# Patient Record
Sex: FEMALE | Race: OTHER | Employment: OTHER | URBAN - METROPOLITAN AREA
[De-identification: names, ages, dates, MRNs, and addresses within clinical notes are randomized per-mention and may not be internally consistent; named-entity substitution may affect disease eponyms.]

---

## 2024-06-24 ENCOUNTER — OFFICE VISIT (OUTPATIENT)
Age: 34
End: 2024-06-24
Payer: COMMERCIAL

## 2024-06-24 VITALS
SYSTOLIC BLOOD PRESSURE: 101 MMHG | DIASTOLIC BLOOD PRESSURE: 65 MMHG | BODY MASS INDEX: 26.26 KG/M2 | HEIGHT: 64 IN | WEIGHT: 153.8 LBS

## 2024-06-24 DIAGNOSIS — Z01.419 WELL WOMAN EXAM WITH ROUTINE GYNECOLOGICAL EXAM: Primary | ICD-10-CM

## 2024-06-24 PROCEDURE — 99204 OFFICE O/P NEW MOD 45 MIN: CPT | Performed by: OBSTETRICS & GYNECOLOGY

## 2024-06-24 RX ORDER — DROSPIRENONE AND ETHINYL ESTRADIOL 0.02-3(28)
1 KIT ORAL DAILY
Qty: 3 PACKET | Refills: 1 | Status: SHIPPED | OUTPATIENT
Start: 2024-06-24 | End: 2024-09-16

## 2024-06-24 NOTE — PROGRESS NOTES
PROBLEM VISIT      Paul Guerrier is 33 y.o. year old  /   female who presents for issues related to her IUD.  She had a Mirena placed several years ago.  She is not having periods.  She has been suffering from worsening acne and is upset about weight gain.  Also thinking about another baby.      Problem List:  Patient Active Problem List    Diagnosis Date Noted    Generalized anxiety disorder with panic attacks 2023    IUD (intrauterine device) in place 2023     Past Medical History:   Diagnosis Date    Anxiety     Depression     IUD (intrauterine device) in place 2017    Mirena -- acne/weight gain     Past Surgical History:   Procedure Laterality Date    BREAST SURGERY       SECTION  2015     SECTION  2019    NOSE SURGERY         OB History    Para Term  AB Living   4 2 2 0 2 2   SAB IAB Ectopic Molar Multiple Live Births   2 0 0 0 0 2      # Outcome Date GA Lbr Alexei/2nd Weight Sex Delivery Anes PTL Lv   4 Term 19    M CS-LTranv   GARY   3 Term 12/17/15    M CS-LTranv   GARY   2 SAB            1 SAB              Social History     Socioeconomic History    Marital status:      Spouse name: None    Number of children: 2    Years of education: None    Highest education level: None   Tobacco Use    Smoking status: Never    Smokeless tobacco: Never   Vaping Use    Vaping Use: Never used   Substance and Sexual Activity    Alcohol use: Not Currently    Drug use: Never    Sexual activity: Yes     Partners: Male     Birth control/protection: I.U.D.     Social Determinants of Health     Financial Resource Strain: Low Risk  (2023)    Overall Financial Resource Strain (CARDIA)     Difficulty of Paying Living Expenses: Not hard at all   Transportation Needs: Unknown (2023)    PRAPARE - Transportation     Lack of Transportation (Non-Medical): No   Housing Stability: Unknown (2023)    Housing Stability Vital Sign

## 2024-06-24 NOTE — PROGRESS NOTES
Paul Guerrier is a 33 y.o. female presents for a problem visit.    Chief Complaint   Patient presents with    Procedure     IUD removal            No LMP recorded.    Birth Control: IUD.    Last Pap: normal obtained 6 - 8 months ago.    Patient wants to conceive.        1. Have you been to the ER, urgent care clinic, or hospitalized since your last visit? N/A NP      2. Have you seen or consulted any other health care providers outside of the [facility] since your last visit? N/A NP          Chart reviewed for the following:   VAL ESQUEDA MARGARET PATTIE, CMA, have reviewed the History, Physical and updated the Allergic reactions for Paul Guerrier     TIME OUT performed immediately prior to start of procedure:   VAL ESQUEDA MARGARET PATTIE, CMA, have performed the following reviews on Paul Guerrier prior to the start of the procedure:            * Patient was identified by name and date of birth   * Agreement on procedure being performed was verified  * Risks and Benefits explained to the patient  * Procedure site verified and marked as necessary  * Patient was positioned for comfort  * Consent was signed and verified     Time: 1:40pm    Date of procedure: 6/24/2024    Procedure performed by:  Garrett Castillo MD       Provider assisted by: Leila Lima MA    Patient assisted by: self    How tolerated by patient: attempted could not remove IUD.      Post Procedural Pain Scale: hurts     Comments: none    Examination chaperoned by GUALBERTO LIMA CMA.

## 2024-07-15 ENCOUNTER — OFFICE VISIT (OUTPATIENT)
Age: 34
End: 2024-07-15
Payer: COMMERCIAL

## 2024-07-15 VITALS
HEIGHT: 64 IN | BODY MASS INDEX: 25.99 KG/M2 | DIASTOLIC BLOOD PRESSURE: 64 MMHG | SYSTOLIC BLOOD PRESSURE: 104 MMHG | WEIGHT: 152.2 LBS

## 2024-07-15 DIAGNOSIS — T83.32XA INTRAUTERINE CONTRACEPTIVE DEVICE THREADS LOST, INITIAL ENCOUNTER: Primary | ICD-10-CM

## 2024-07-15 PROCEDURE — 99213 OFFICE O/P EST LOW 20 MIN: CPT | Performed by: OBSTETRICS & GYNECOLOGY

## 2024-07-15 RX ORDER — BUSPIRONE HYDROCHLORIDE 5 MG/1
5 TABLET ORAL 2 TIMES DAILY
COMMUNITY
Start: 2024-07-09

## 2024-07-15 NOTE — PROGRESS NOTES
Paul Jacob is a 33 y.o. female presents for a problem visit.    Chief Complaint   Patient presents with    Follow-up    Ultrasound     No LMP recorded. (Menstrual status: IUD).  Birth Control: IUD.  Last Pap: normal obtained 6 - 8 months ago.    The patient is reporting having: Ultrasound Follow Up for IUD.   .  Ultrasound report:  TV ULTRASOUND PERFORMED. UTERUS IS ANTEVERTED, NORMAL IN SIZE AND ECHOGENICITY. ENDOMETRIUM MEASURES 2-3MM IN THICKNESS. NO EVIDENCE OF MASSES OR ABNORMALITIES ARE SEEN. IUD IS SEEN IN THE PROPER POSITION WITHIN THE ENDOMETRIAL CAVITY IN THE UTERINE FUNDUS. RIGHT OVARY APPEARS WITHIN NORMAL LIMITS. LEFT OVARY APPEARS WITHIN NORMAL LIMITS. FREE FLUID SEEN IN THE CDS.        1. Have you been to the ER, urgent care clinic, or hospitalized since your last visit? No    2. Have you seen or consulted any other health care providers outside of the [facility] since your last visit? No    Examination chaperoned by GUALBERTO NEAL CMA.

## 2024-07-15 NOTE — PROGRESS NOTES
PROBLEM VISIT      Paul Jacob is 33 y.o. year old  /   female who presents for evaluation of lost IUD string.    Wants removed due to acne and weight gain.  Attempted several times at last visit without success.     Ultrasound was done today to evaluate these complaints and shows:   TV ULTRASOUND PERFORMED. UTERUS IS ANTEVERTED, NORMAL IN SIZE AND ECHOGENICITY. ENDOMETRIUM MEASURES 2-3MM IN THICKNESS. NO EVIDENCE OF MASSES OR ABNORMALITIES ARE SEEN. IUD IS SEEN IN THE PROPER POSITION WITHIN THE ENDOMETRIAL CAVITY IN THE UTERINE FUNDUS. RIGHT OVARY APPEARS WITHIN NORMAL LIMITS. LEFT OVARY APPEARS WITHIN NORMAL LIMITS. FREE FLUID SEEN IN THE CDS.     Problem List:  Patient Active Problem List    Diagnosis Date Noted    Generalized anxiety disorder with panic attacks 2023    IUD (intrauterine device) in place 2023     Past Medical History:   Diagnosis Date    Anxiety     Depression     IUD (intrauterine device) in place     Mirena -- acne/weight gain     Past Surgical History:   Procedure Laterality Date    BREAST SURGERY       SECTION       SECTION  2019    NOSE SURGERY         OB History    Para Term  AB Living   4 2 2 0 2 2   SAB IAB Ectopic Molar Multiple Live Births   2 0 0 0 0 2      # Outcome Date GA Lbr Alexei/2nd Weight Sex Delivery Anes PTL Lv   4 Term 19    M CS-LTranv   GARY   3 Term /15    M CS-LTranv   GARY   2 SAB            1 SAB              Social History     Socioeconomic History    Marital status:     Number of children: 2   Tobacco Use    Smoking status: Never    Smokeless tobacco: Never   Vaping Use    Vaping Use: Never used   Substance and Sexual Activity    Alcohol use: Not Currently    Drug use: Never    Sexual activity: Yes     Partners: Male     Birth control/protection: I.U.D.   Social History Narrative    [facility]      Social Determinants of Health     Financial Resource Strain: Low

## 2024-07-18 ENCOUNTER — PREP FOR PROCEDURE (OUTPATIENT)
Facility: HOSPITAL | Age: 34
End: 2024-07-18

## 2024-07-18 PROBLEM — T83.32XA MALPOSITIONED INTRAUTERINE DEVICE: Status: ACTIVE | Noted: 2024-07-18

## 2024-07-23 ENCOUNTER — PREP FOR PROCEDURE (OUTPATIENT)
Facility: HOSPITAL | Age: 34
End: 2024-07-23

## 2024-07-23 DIAGNOSIS — T83.32XD MALPOSITIONED INTRAUTERINE DEVICE (IUD), SUBSEQUENT ENCOUNTER: Primary | ICD-10-CM

## 2024-08-21 NOTE — PERIOP NOTE
or metal hair clips  Wear loose, comfortable, clean clothes  Wear glasses instead of contacts  Leave money, valuables, and jewelry, including body piercings, at home  If you were given an Incentive Spirometer, bring it on day of surgery.     Going Home - or Spending the Night SAME-DAY SURGERY: You must have a responsible adult drive you home and stay with you 24 hours after surgery  ADMITS: If your doctor is keeping you in the hospital after surgery, leave personal belongings/luggage in your car until you have a hospital room number.       Hospital discharge time is 12 noon     Drivers must be here before 12 noon unless you are told differently   Special Instructions           Follow all instructions so your surgery won’t be cancelled.  Please, be on time.                    If a situation occurs and you are delayed the day of surgery, call [phone] or [phone].    If your physical condition changes (like a fever, cold, flu, etc.) call your surgeon.    Home medication(s) reviewed and verified verbally with list during PAT appointment.

## 2024-08-22 ENCOUNTER — HOSPITAL ENCOUNTER (OUTPATIENT)
Facility: HOSPITAL | Age: 34
Setting detail: OUTPATIENT SURGERY
Discharge: HOME | End: 2024-08-22
Attending: OBSTETRICS & GYNECOLOGY | Admitting: OBSTETRICS & GYNECOLOGY
Payer: COMMERCIAL

## 2024-08-22 ENCOUNTER — ANESTHESIA (OUTPATIENT)
Facility: HOSPITAL | Age: 34
End: 2024-08-22
Payer: COMMERCIAL

## 2024-08-22 ENCOUNTER — ANESTHESIA EVENT (OUTPATIENT)
Facility: HOSPITAL | Age: 34
End: 2024-08-22
Payer: COMMERCIAL

## 2024-08-22 VITALS
HEART RATE: 72 BPM | WEIGHT: 155.2 LBS | RESPIRATION RATE: 15 BRPM | SYSTOLIC BLOOD PRESSURE: 108 MMHG | TEMPERATURE: 97.2 F | HEIGHT: 62 IN | BODY MASS INDEX: 28.56 KG/M2 | OXYGEN SATURATION: 100 % | DIASTOLIC BLOOD PRESSURE: 68 MMHG

## 2024-08-22 DIAGNOSIS — T83.32XD MALPOSITIONED INTRAUTERINE DEVICE (IUD), SUBSEQUENT ENCOUNTER: ICD-10-CM

## 2024-08-22 LAB
BASOPHILS # BLD: 0 K/UL (ref 0–0.1)
BASOPHILS NFR BLD: 1 % (ref 0–1)
DIFFERENTIAL METHOD BLD: ABNORMAL
EOSINOPHIL # BLD: 0.1 K/UL (ref 0–0.4)
EOSINOPHIL NFR BLD: 1 % (ref 0–7)
ERYTHROCYTE [DISTWIDTH] IN BLOOD BY AUTOMATED COUNT: 12.4 % (ref 11.5–14.5)
HCT VFR BLD AUTO: 34.8 % (ref 35–47)
HGB BLD-MCNC: 11.6 G/DL (ref 11.5–16)
IMM GRANULOCYTES # BLD AUTO: 0 K/UL (ref 0–0.04)
IMM GRANULOCYTES NFR BLD AUTO: 0 % (ref 0–0.5)
LYMPHOCYTES # BLD: 1.1 K/UL (ref 0.8–3.5)
LYMPHOCYTES NFR BLD: 23 % (ref 12–49)
MCH RBC QN AUTO: 29.7 PG (ref 26–34)
MCHC RBC AUTO-ENTMCNC: 33.3 G/DL (ref 30–36.5)
MCV RBC AUTO: 89 FL (ref 80–99)
MONOCYTES # BLD: 0.6 K/UL (ref 0–1)
MONOCYTES NFR BLD: 13 % (ref 5–13)
NEUTS SEG # BLD: 3 K/UL (ref 1.8–8)
NEUTS SEG NFR BLD: 62 % (ref 32–75)
NRBC # BLD: 0 K/UL (ref 0–0.01)
NRBC BLD-RTO: 0 PER 100 WBC
PLATELET # BLD AUTO: 267 K/UL (ref 150–400)
PMV BLD AUTO: 10.1 FL (ref 8.9–12.9)
RBC # BLD AUTO: 3.91 M/UL (ref 3.8–5.2)
WBC # BLD AUTO: 4.8 K/UL (ref 3.6–11)

## 2024-08-22 PROCEDURE — 2500000003 HC RX 250 WO HCPCS

## 2024-08-22 PROCEDURE — 36415 COLL VENOUS BLD VENIPUNCTURE: CPT

## 2024-08-22 PROCEDURE — 3700000001 HC ADD 15 MINUTES (ANESTHESIA): Performed by: OBSTETRICS & GYNECOLOGY

## 2024-08-22 PROCEDURE — 3600000013 HC SURGERY LEVEL 3 ADDTL 15MIN: Performed by: OBSTETRICS & GYNECOLOGY

## 2024-08-22 PROCEDURE — 7100000011 HC PHASE II RECOVERY - ADDTL 15 MIN: Performed by: OBSTETRICS & GYNECOLOGY

## 2024-08-22 PROCEDURE — 7100000010 HC PHASE II RECOVERY - FIRST 15 MIN: Performed by: OBSTETRICS & GYNECOLOGY

## 2024-08-22 PROCEDURE — 2580000003 HC RX 258

## 2024-08-22 PROCEDURE — 2709999900 HC NON-CHARGEABLE SUPPLY: Performed by: OBSTETRICS & GYNECOLOGY

## 2024-08-22 PROCEDURE — 85025 COMPLETE CBC W/AUTO DIFF WBC: CPT

## 2024-08-22 PROCEDURE — 58562 HYSTEROSCOPY REMOVE FB: CPT | Performed by: OBSTETRICS & GYNECOLOGY

## 2024-08-22 PROCEDURE — 3700000000 HC ANESTHESIA ATTENDED CARE: Performed by: OBSTETRICS & GYNECOLOGY

## 2024-08-22 PROCEDURE — 6360000002 HC RX W HCPCS

## 2024-08-22 PROCEDURE — 3600000003 HC SURGERY LEVEL 3 BASE: Performed by: OBSTETRICS & GYNECOLOGY

## 2024-08-22 RX ORDER — PROPOFOL 10 MG/ML
INJECTION, EMULSION INTRAVENOUS PRN
Status: DISCONTINUED | OUTPATIENT
Start: 2024-08-22 | End: 2024-08-22 | Stop reason: SDUPTHER

## 2024-08-22 RX ORDER — SODIUM CHLORIDE, SODIUM LACTATE, POTASSIUM CHLORIDE, CALCIUM CHLORIDE 600; 310; 30; 20 MG/100ML; MG/100ML; MG/100ML; MG/100ML
INJECTION, SOLUTION INTRAVENOUS CONTINUOUS
Status: DISCONTINUED | OUTPATIENT
Start: 2024-08-22 | End: 2024-08-22 | Stop reason: HOSPADM

## 2024-08-22 RX ORDER — KETOROLAC TROMETHAMINE 30 MG/ML
INJECTION, SOLUTION INTRAMUSCULAR; INTRAVENOUS PRN
Status: DISCONTINUED | OUTPATIENT
Start: 2024-08-22 | End: 2024-08-22 | Stop reason: SDUPTHER

## 2024-08-22 RX ORDER — NALOXONE HYDROCHLORIDE 0.4 MG/ML
INJECTION, SOLUTION INTRAMUSCULAR; INTRAVENOUS; SUBCUTANEOUS PRN
Status: DISCONTINUED | OUTPATIENT
Start: 2024-08-22 | End: 2024-08-22 | Stop reason: HOSPADM

## 2024-08-22 RX ORDER — DEXMEDETOMIDINE HYDROCHLORIDE 100 UG/ML
INJECTION, SOLUTION INTRAVENOUS PRN
Status: DISCONTINUED | OUTPATIENT
Start: 2024-08-22 | End: 2024-08-22 | Stop reason: SDUPTHER

## 2024-08-22 RX ORDER — LIDOCAINE HYDROCHLORIDE 20 MG/ML
INJECTION, SOLUTION EPIDURAL; INFILTRATION; INTRACAUDAL; PERINEURAL PRN
Status: DISCONTINUED | OUTPATIENT
Start: 2024-08-22 | End: 2024-08-22 | Stop reason: SDUPTHER

## 2024-08-22 RX ORDER — MIDAZOLAM HYDROCHLORIDE 1 MG/ML
INJECTION INTRAMUSCULAR; INTRAVENOUS PRN
Status: DISCONTINUED | OUTPATIENT
Start: 2024-08-22 | End: 2024-08-22 | Stop reason: SDUPTHER

## 2024-08-22 RX ORDER — FENTANYL CITRATE 50 UG/ML
INJECTION, SOLUTION INTRAMUSCULAR; INTRAVENOUS PRN
Status: DISCONTINUED | OUTPATIENT
Start: 2024-08-22 | End: 2024-08-22 | Stop reason: SDUPTHER

## 2024-08-22 RX ORDER — ONDANSETRON 2 MG/ML
4 INJECTION INTRAMUSCULAR; INTRAVENOUS
Status: DISCONTINUED | OUTPATIENT
Start: 2024-08-22 | End: 2024-08-22 | Stop reason: HOSPADM

## 2024-08-22 RX ORDER — DIPHENHYDRAMINE HYDROCHLORIDE 50 MG/ML
12.5 INJECTION INTRAMUSCULAR; INTRAVENOUS
Status: DISCONTINUED | OUTPATIENT
Start: 2024-08-22 | End: 2024-08-22 | Stop reason: HOSPADM

## 2024-08-22 RX ORDER — FENTANYL CITRATE 50 UG/ML
100 INJECTION, SOLUTION INTRAMUSCULAR; INTRAVENOUS
Status: DISCONTINUED | OUTPATIENT
Start: 2024-08-22 | End: 2024-08-22 | Stop reason: HOSPADM

## 2024-08-22 RX ORDER — MIDAZOLAM HYDROCHLORIDE 2 MG/2ML
2 INJECTION, SOLUTION INTRAMUSCULAR; INTRAVENOUS
Status: DISCONTINUED | OUTPATIENT
Start: 2024-08-22 | End: 2024-08-22 | Stop reason: HOSPADM

## 2024-08-22 RX ORDER — LIDOCAINE HYDROCHLORIDE 10 MG/ML
1 INJECTION, SOLUTION EPIDURAL; INFILTRATION; INTRACAUDAL; PERINEURAL
Status: DISCONTINUED | OUTPATIENT
Start: 2024-08-22 | End: 2024-08-22 | Stop reason: HOSPADM

## 2024-08-22 RX ORDER — SODIUM CHLORIDE, SODIUM LACTATE, POTASSIUM CHLORIDE, CALCIUM CHLORIDE 600; 310; 30; 20 MG/100ML; MG/100ML; MG/100ML; MG/100ML
INJECTION, SOLUTION INTRAVENOUS CONTINUOUS PRN
Status: DISCONTINUED | OUTPATIENT
Start: 2024-08-22 | End: 2024-08-22 | Stop reason: SDUPTHER

## 2024-08-22 RX ADMIN — SODIUM CHLORIDE, SODIUM LACTATE, POTASSIUM CHLORIDE, AND CALCIUM CHLORIDE: 600; 310; 30; 20 INJECTION, SOLUTION INTRAVENOUS at 15:25

## 2024-08-22 RX ADMIN — FENTANYL CITRATE 50 MCG: 50 INJECTION, SOLUTION INTRAMUSCULAR; INTRAVENOUS at 15:28

## 2024-08-22 RX ADMIN — DEXMEDETOMIDINE 4 MCG: 100 INJECTION, SOLUTION INTRAVENOUS at 15:25

## 2024-08-22 RX ADMIN — KETOROLAC TROMETHAMINE 30 MG: 30 INJECTION, SOLUTION INTRAMUSCULAR at 15:48

## 2024-08-22 RX ADMIN — FENTANYL CITRATE 25 MCG: 50 INJECTION, SOLUTION INTRAMUSCULAR; INTRAVENOUS at 15:40

## 2024-08-22 RX ADMIN — LIDOCAINE HYDROCHLORIDE 40 MG: 20 INJECTION, SOLUTION EPIDURAL; INFILTRATION; INTRACAUDAL; PERINEURAL at 15:28

## 2024-08-22 RX ADMIN — MIDAZOLAM HYDROCHLORIDE 2 MG: 1 INJECTION, SOLUTION INTRAMUSCULAR; INTRAVENOUS at 15:25

## 2024-08-22 RX ADMIN — FENTANYL CITRATE 25 MCG: 50 INJECTION, SOLUTION INTRAMUSCULAR; INTRAVENOUS at 15:37

## 2024-08-22 RX ADMIN — PROPOFOL 50 MG: 10 INJECTION, EMULSION INTRAVENOUS at 15:28

## 2024-08-22 RX ADMIN — PROPOFOL 100 MCG/KG/MIN: 10 INJECTION, EMULSION INTRAVENOUS at 15:29

## 2024-08-22 ASSESSMENT — PAIN - FUNCTIONAL ASSESSMENT: PAIN_FUNCTIONAL_ASSESSMENT: NONE - DENIES PAIN

## 2024-08-22 ASSESSMENT — PAIN SCALES - GENERAL: PAINLEVEL_OUTOF10: 0

## 2024-08-22 NOTE — H&P
Gynecology Preop History and Physical    Name: Paul Jacob MRN: [id number] SSN: [id number]    YOB: 1990  Age: 33 y.o.  Sex: female       Subjective:      Chief complaint: Lost IUD string      Paul Jacob is 33 y.o. year old  /   female who presents for lost IUD string.  Previous evaluation has been done with US, which revealed intrauterine IUD.  Previous treatment has consisted of attempted office removal patient could not tolerate.  She is now admitted for outpatient surgery consisting of Procedure(s) (LRB):  HYSTEROSCOPIC INTRAUTERINE DEVICE REMOVAL (N/A).    The current method of family planning is intrauterine device.    Problem List:  Patient Active Problem List    Diagnosis Date Noted    Malpositioned intrauterine device 2024    Generalized anxiety disorder with panic attacks 2023    IUD (intrauterine device) in place 2023     Past Medical History:   Diagnosis Date    Anxiety     Depression     IUD (intrauterine device) in place     Mirena -- acne/weight gain     Past Surgical History:   Procedure Laterality Date    BREAST ENHANCEMENT SURGERY Bilateral      SECTION  2015     SECTION  2019    RHINOPLASTY         OB History    Para Term  AB Living   4 2 2 0 2 2   SAB IAB Ectopic Molar Multiple Live Births   2 0 0 0 0 2      # Outcome Date GA Lbr Alexei/2nd Weight Sex Type Anes PTL Lv   4 Term 19    M CS-LTranv   GARY   3 Term /15    M CS-LTranv   GARY   2 SAB            1 SAB              Social History     Socioeconomic History    Marital status:      Spouse name: None    Number of children: 2    Years of education: None    Highest education level: None   Tobacco Use    Smoking status: Never    Smokeless tobacco: Never   Vaping Use    Vaping status: Never Used   Substance and Sexual Activity    Alcohol use: Not Currently    Drug use: Never    Sexual activity: Yes     Partners: Male     Birth

## 2024-08-22 NOTE — DISCHARGE SUMMARY
Gynecology Surgical Discharge Summary     Name: Paul Jacob MRN: [id number]  SSN: [id number]    YOB: 1990  Age: 33 y.o.  Sex: female      Admit date: 8/22/2024    Discharge Date: 8/22/2024      Attending Physician: Garrett Castillo II, MD     Admission Diagnoses: Malpositioned intrauterine device [T83.32XA]    Discharge Diagnoses: Malpositioned intrauterine device [T83.32XA]   Principal Problem:    Malpositioned intrauterine device  Resolved Problems:    * No resolved hospital problems. *       Procedures: Procedure(s):  HYSTEROSCOPIC INTRAUTERINE DEVICE REMOVAL    Hospital Course: Normal hospital course for this procedure.  The patient was released to her home in good condition.    Significant Diagnostic Studies:   Recent Results (from the past 24 hour(s))   CBC with Auto Differential    Collection Time: 08/22/24  2:30 PM   Result Value Ref Range    WBC 4.8 3.6 - 11.0 K/uL    RBC 3.91 3.80 - 5.20 M/uL    Hemoglobin 11.6 11.5 - 16.0 g/dL    Hematocrit 34.8 (L) 35.0 - 47.0 %    MCV 89.0 80.0 - 99.0 FL    MCH 29.7 26.0 - 34.0 PG    MCHC 33.3 30.0 - 36.5 g/dL    RDW 12.4 11.5 - 14.5 %    Platelets 267 150 - 400 K/uL    MPV 10.1 8.9 - 12.9 FL    Nucleated RBCs 0.0 0  WBC    nRBC 0.00 0.00 - 0.01 K/uL    Neutrophils % 62 32 - 75 %    Lymphocytes % 23 12 - 49 %    Monocytes % 13 5 - 13 %    Eosinophils % 1 0 - 7 %    Basophils % 1 0 - 1 %    Immature Granulocytes % 0 0.0 - 0.5 %    Neutrophils Absolute 3.0 1.8 - 8.0 K/UL    Lymphocytes Absolute 1.1 0.8 - 3.5 K/UL    Monocytes Absolute 0.6 0.0 - 1.0 K/UL    Eosinophils Absolute 0.1 0.0 - 0.4 K/UL    Basophils Absolute 0.0 0.0 - 0.1 K/UL    Immature Granulocytes Absolute 0.0 0.00 - 0.04 K/UL    Differential Type AUTOMATED         Patient Instructions:     Diet, activity, wound care: See printed instructions.    Current Discharge Medication List        I spent 10 minutes discharging the patient in face to face contact.  Follow-up Information

## 2024-08-22 NOTE — OP NOTE
[facility]  [address] [place] [address]    OPERATIVE REPORT    Name: Paul Jacob   Medical Record Number: [id number]   YOB: 1990    Date of Surgery: 8/22/2024    Preoperative Diagnosis: Malpositioned intrauterine device [T83.32XA]    Postoperative Diagnosis: Same    Procedure: Procedure(s):  HYSTEROSCOPIC INTRAUTERINE DEVICE REMOVAL    OR Staff:  Surgeon(s):  Garrett Castillo II, MD  * No surgical staff found *   Anesthesiologist: Augustine Bella MD  CRNA: Geeta Davila APRN - CRNA     Anesthesia: Monitor Anesthesia Care    Findings: see below    Estimated Blood Loss:  none    Drains: * No LDAs found *    Pathology /Specimens:  * No order type specified *    Implants:  * No implants in log *    DVT Prophylaxis: JHON Hose    Complications: None    PROCEDURE:  The patient was prepped and draped in the dorsal lithotomy position after institution of general anesthesia.  A weighted speculum was placed.  The cervix was grasped with a single-toothed tenaculum.   The cervix was dilated and then the myosure hysteroscope was introduced.  The Fluent fluid management system was used to distend the cavity with normal saline under a pressure of 80mm mercury. The endometrial cavity was inspected.  There was an IUD in the normal intrauterine position and a normal appearing endometrium noted.  The string was well up into the uterine cavity.  It was grasped with small graspers and The IUD was removed.The scope was removed.      The procedure was terminated. The patient was awakened and taken to the recovery room in good condition.    Signed By:  Garrett Castillo MD     August 22, 2024

## 2024-08-22 NOTE — ANESTHESIA POSTPROCEDURE EVALUATION
Department of Anesthesiology  Postprocedure Note    Patient: Paul Jacob  MRN: [id number]  YOB: 1990  Date of evaluation: 8/22/2024    Procedure Summary       Date: 08/22/24 Room / Location: [facility] MAIN OR  / [facility] MAIN OR    Anesthesia Start: 1525 Anesthesia Stop: 1601    Procedure: HYSTEROSCOPIC INTRAUTERINE DEVICE REMOVAL (Cervix) Diagnosis:       Malpositioned intrauterine device      (Malpositioned intrauterine device [T83.32XA])    Surgeons: Garrett Castillo II, MD Responsible Provider: Polina Olmos MD    Anesthesia Type: MAC ASA Status: 1            Anesthesia Type: MAC    Lory Phase I: Lory Score: 10    Lory Phase II: Lory Score: 10    Anesthesia Post Evaluation    No notable events documented.

## 2024-08-22 NOTE — ANESTHESIA PRE PROCEDURE
Department of Anesthesiology  Preprocedure Note       Name:  Paul Jacob   Age:  33 y.o.  :  1990                                          MRN:  [id number]         Date:  2024      Surgeon: Surgeon(s):  Garrett Castillo II, MD    Procedure: Procedure(s):  HYSTEROSCOPIC INTRAUTERINE DEVICE REMOVAL    Medications prior to admission:   Prior to Admission medications    Medication Sig Start Date End Date Taking? Authorizing Provider   sertraline (ZOLOFT) 50 MG tablet Take 1 tablet by mouth daily 24   Rahel Hurtado MD   busPIRone (BUSPAR) 5 MG tablet Take 1 tablet by mouth daily 24   ProviderRahel MD       Current medications:    Current Facility-Administered Medications   Medication Dose Route Frequency Provider Last Rate Last Admin   • lidocaine PF 1 % injection 1 mL  1 mL IntraDERmal Once PRN Ze Oseguera MD       • fentaNYL (SUBLIMAZE) injection 100 mcg  100 mcg IntraVENous Once PRN Ze Oseguera MD       • lactated ringers IV soln infusion   IntraVENous Continuous Ze Oseguera MD       • midazolam PF (VERSED) injection 2 mg  2 mg IntraVENous Once PRN Ze Oseguera MD           Allergies:  No Known Allergies    Problem List:    Patient Active Problem List   Diagnosis Code   • Generalized anxiety disorder with panic attacks F41.1, F41.0   • IUD (intrauterine device) in place Z97.5   • Malpositioned intrauterine device T83.32XA       Past Medical History:        Diagnosis Date   • Anxiety    • Depression    • IUD (intrauterine device) in place 2017    Mirena -- acne/weight gain       Past Surgical History:        Procedure Laterality Date   • BREAST ENHANCEMENT SURGERY Bilateral    •  SECTION  2015   •  SECTION  2019   • RHINOPLASTY         Social History:    Social History     Tobacco Use   • Smoking status: Never   • Smokeless tobacco: Never   Substance Use Topics   • Alcohol use: Not Currently                                Counseling given:

## 2024-08-22 NOTE — DISCHARGE INSTRUCTIONS
Post Anesthesia instructions:  Limit activities  Don't drive until tomorrow morning  Don't make important decisions until tomorrow morning  Don't drink alcohol for 24 hours.

## 2025-01-02 DIAGNOSIS — N91.2 AMENORRHEA: Primary | ICD-10-CM

## 2025-01-08 ENCOUNTER — CLINICAL DOCUMENTATION (OUTPATIENT)
Age: 35
End: 2025-01-08

## 2025-01-08 NOTE — PROGRESS NOTES
Patient's appointments for 01/06/2025 at 10:00 am for ultrasound and 10:45 am for Dr. Garrett Castillo were attempted to be cancelled by Iqra Bella with three separate phone calls with no answer and voicemail unavailable on Sunday, 01/05/2025. Patient,  and daughter showed up to the office for appointment on 1/6/25 and were advised with an interpretor that we tried to reach her on Sunday to cancel her appointments due to the inclement weather but was unable to reach her. Cathy advised the patient and her  that we would need to reschedule. Ly Atkins found me in the break room and stated that the  was screaming at [facility] and that I was needed. I advised the patient and her  that we were very sorry about the confusion but unfortunately we were down an ultrasound tech and that  did not have any afternoon appointments available. I went and spoke with Dr. Castillo and he stated that he could not see this patient today and if they refused to leave to call security. I advised the patient and her  again that Dr. Castillo could not see them today and that if she was having an emergency then she needed to go downstairs to the emergency room. She then began to tell me that she has not had any prenatal care and she is three months pregnant and has had no medication. I told her again that we can reschedule her for the 01/16/2025, Thursday. She requested to speak with Dr. Castillo and I advised her that Dr. Castillo was seeing patients and was unavailable. She wanted his personal cell phone number. I gave her his business card with our office number listed. They walked away from the desk and went and sat down in Dr. Castillo's waiting room. I went to the waiting room and asked them to leave and stated that I would have to call security. Security was called. Then  and daughter came back up to the window at [facility]'s desk and he got a  on his phone. He stated he did not

## 2025-01-16 ENCOUNTER — HOSPITAL ENCOUNTER (EMERGENCY)
Facility: HOSPITAL | Age: 35
Discharge: HOME | End: 2025-01-16
Attending: EMERGENCY MEDICINE
Payer: COMMERCIAL

## 2025-01-16 ENCOUNTER — TELEPHONE (OUTPATIENT)
Age: 35
End: 2025-01-16

## 2025-01-16 VITALS
SYSTOLIC BLOOD PRESSURE: 111 MMHG | WEIGHT: 168.65 LBS | TEMPERATURE: 98.4 F | RESPIRATION RATE: 16 BRPM | HEIGHT: 65 IN | OXYGEN SATURATION: 100 % | HEART RATE: 77 BPM | BODY MASS INDEX: 28.1 KG/M2 | DIASTOLIC BLOOD PRESSURE: 69 MMHG

## 2025-01-16 DIAGNOSIS — O26.899 ABDOMINAL PAIN AFFECTING PREGNANCY: Primary | ICD-10-CM

## 2025-01-16 DIAGNOSIS — R10.9 ABDOMINAL PAIN AFFECTING PREGNANCY: Primary | ICD-10-CM

## 2025-01-16 PROCEDURE — 99282 EMERGENCY DEPT VISIT SF MDM: CPT

## 2025-01-16 ASSESSMENT — PAIN - FUNCTIONAL ASSESSMENT: PAIN_FUNCTIONAL_ASSESSMENT: 0-10

## 2025-01-16 ASSESSMENT — ENCOUNTER SYMPTOMS
VOMITING: 0
COUGH: 0
SORE THROAT: 0

## 2025-01-16 ASSESSMENT — PAIN SCALES - GENERAL: PAINLEVEL_OUTOF10: 5

## 2025-01-16 ASSESSMENT — PAIN DESCRIPTION - ORIENTATION: ORIENTATION: MID;LOWER

## 2025-01-16 ASSESSMENT — PAIN DESCRIPTION - LOCATION: LOCATION: ABDOMEN

## 2025-01-16 ASSESSMENT — PAIN DESCRIPTION - DESCRIPTORS: DESCRIPTORS: ACHING

## 2025-01-16 NOTE — CARE COORDINATION
01/16/25  2:05 PM    Pt presenting from OB/GYN office upstairs escorted by security, and also with staff from [facility] [employer], Delphine Graham. CM was called to speak with the pt and [employer], and ultimately the pt would like to apply for medicaid and feels she needs additional insurance other than her commercial [employer] plan. CM assisted in [facility] referral and sent HIPAA compliant referral to [facility] to follow up with pt outpatient regarding medicaid application. No further CM needs noted.    Deja Patino MA, BSW, ACM-SW  [facility]      Available via [employer]

## 2025-01-16 NOTE — ED TRIAGE NOTES
Pt arrives to ED via POV ambulatory stating she just found out she was pregnant and states she got pregnant in November 2024. Last period end of October 2024. Pt reports she came to the ED for an US to check on the baby. Pt reports she has mid lower abd pain and denies vaginal bleeding or discharge. Pt has appointment with OBGYN on 1/30. Provider in triage. Provider performed bedside US in triage to reassure pt.

## 2025-01-16 NOTE — TELEPHONE ENCOUNTER
Two patient identifiers used    34 year old patient to be seen for eob on 1/30/2025      Antione #[id number] assisted with communication.    Patient was to have been seen on 1/6/2025 but the appointment was cancelled due to weather ( see documentation for 1/8/2025)      Patient calling about having appointment for today 1/16/2025.    This nurse advised that she does not have appointment for today and that she is scheduled for appointment on 1/30/2025 for ultrasound and eob.    Patient asking why she was not scheduled for appointment for today and wanting to speak to supervisor.    This nurse read documentation from 1.8.2025 and  provided summary to the patient but somewhere in the conversation the patient disconnected from the call.    This nurse asked if the patient was having a problem that she needed to be seen for and the questions was not answered before the patient dis connected the phone call.

## 2025-01-16 NOTE — ED PROVIDER NOTES
daily    SERTRALINE (ZOLOFT) 50 MG TABLET    Take 1 tablet by mouth daily       ALLERGIES     Patient has no known allergies.    FAMILY HISTORY     No family history on file.       SOCIAL HISTORY       Social History     Socioeconomic History    Marital status:     Number of children: 2   Tobacco Use    Smoking status: Never    Smokeless tobacco: Never   Vaping Use    Vaping status: Never Used   Substance and Sexual Activity    Alcohol use: Not Currently    Drug use: Never    Sexual activity: Yes     Partners: Male     Birth control/protection: I.U.D.   Social History Narrative    [employer] [employer] [facility] 2022     Social Determinants of Health     Financial Resource Strain: Low Risk  (11/1/2023)    Overall Financial Resource Strain (CARDIA)     Difficulty of Paying Living Expenses: Not hard at all   Transportation Needs: Unknown (11/1/2023)    PRAPARE - Transportation     Lack of Transportation (Non-Medical): No   Housing Stability: Unknown (11/1/2023)    Housing Stability Vital Sign     Unstable Housing in the Last Year: No           PHYSICAL EXAM    (up to 7 for level 4, 8 or more for level 5)     ED Triage Vitals   BP Systolic BP Percentile Diastolic BP Percentile Temp Temp src Pulse Resp SpO2   -- -- -- -- -- -- -- --      Height Weight         -- --             Body mass index is 28.07 kg/m².    Physical Exam  HENT:      Head: Normocephalic.      Mouth/Throat:      Mouth: Mucous membranes are moist.   Eyes:      General: No scleral icterus.  Cardiovascular:      Rate and Rhythm: Normal rate.   Pulmonary:      Effort: Pulmonary effort is normal.   Abdominal:      General: There is no distension.   Musculoskeletal:         General: No deformity.      Cervical back: Neck supple.   Skin:     Findings: No rash.   Neurological:      General: No focal deficit present.      Mental Status: She is alert and oriented to person, place, and time.         DIAGNOSTIC RESULTS     EKG: All EKG's are interpreted by the Emergency

## 2025-01-29 DIAGNOSIS — N91.2 AMENORRHEA: Primary | ICD-10-CM

## 2025-01-30 ENCOUNTER — ROUTINE PRENATAL (OUTPATIENT)
Age: 35
End: 2025-01-30

## 2025-01-30 VITALS
HEART RATE: 74 BPM | WEIGHT: 168 LBS | RESPIRATION RATE: 16 BRPM | DIASTOLIC BLOOD PRESSURE: 69 MMHG | HEIGHT: 65 IN | SYSTOLIC BLOOD PRESSURE: 112 MMHG | BODY MASS INDEX: 27.99 KG/M2

## 2025-01-30 DIAGNOSIS — Z3A.13 13 WEEKS GESTATION OF PREGNANCY: ICD-10-CM

## 2025-01-30 DIAGNOSIS — Z01.419 ENCOUNTER FOR CERVICAL PAP SMEAR WITH PELVIC EXAM: Primary | ICD-10-CM

## 2025-01-30 DIAGNOSIS — O34.219 H/O CESAREAN SECTION COMPLICATING PREGNANCY: ICD-10-CM

## 2025-01-30 PROBLEM — T83.32XA MALPOSITIONED INTRAUTERINE DEVICE: Status: RESOLVED | Noted: 2024-07-18 | Resolved: 2025-01-30

## 2025-01-30 PROBLEM — Z97.5 IUD (INTRAUTERINE DEVICE) IN PLACE: Status: RESOLVED | Noted: 2023-11-01 | Resolved: 2025-01-30

## 2025-01-30 PROCEDURE — 0500F INITIAL PRENATAL CARE VISIT: CPT | Performed by: OBSTETRICS & GYNECOLOGY

## 2025-01-30 RX ORDER — FOLIC ACID 1 MG/1
1 TABLET ORAL DAILY
COMMUNITY
Start: 2025-01-03

## 2025-01-30 NOTE — PROGRESS NOTES
Paul Jacob is a 34 y.o. female presents for a new pregnancy visit.    Chief Complaint   Patient presents with    Initial Prenatal Visit    Pregnancy Ultrasound    Blood Work            Patient's last menstrual period was 2024.    Last Pap: normal obtained 1 year ago.      LMP history:  The date of her LMP is 2024 certain.  Her last menstrual period was normal and lasted for 4 to 5 days.  She was not on the pill at conception.     Based on her LMP, her EDC is 2025 and her EGA is 11 weeks,3 days. Her menstrual cycles are regular and occur approximately every 28 days and range from 3 to 5 days. The last menses lasted  the usual number of days.      Ultrasound data:  She had an ultrasound done by the ultrasound tech today which revealed a viable braswell pregnancy with a gestational age of 13 weeks and 3 days giving an EDC of 2025. US OB TRANSVAGINAL    Result Date: 2025  Vaginal ultrasound images are reviewed today and demonstrate a braswell live intrauterine gestation with active fetal cardiac activity.  Fetal ventricular heart rate is 155 bpm.  Crown-rump length average measurements are consistent with 13 weeks and 3 days today which does not confirm her LMP.  Placenta appears normal.  Right ovary appears within normal limits.  Left ovary appears within normal limits.  There is no free cul-de-sac fluid.  EDC by her ultrasound today is 2025.      Pregnancy symptoms:    Since her LMP she has experienced urinary frequency, breast tenderness, and nausea.   She has not been vomiting over the last few weeks.  Associated signs and symptoms which she denies: dysuria, discharge, vaginal bleeding.    She states she has gained weight:  Approximately 5 pounds over the last few weeks.    Relevant past pregnancy history:   She has the following pregnancy history: c section.    She has no history of  delivery.    Relevant past medical history:(relevant to this pregnancy):

## 2025-01-31 PROBLEM — E55.9 VITAMIN D DEFICIENCY: Status: ACTIVE | Noted: 2025-01-31

## 2025-01-31 LAB
25(OH)D3+25(OH)D2 SERPL-MCNC: 9.2 NG/ML (ref 30–100)
ABO GROUP BLD: NORMAL
BLD GP AB SCN SERPL QL: NEGATIVE
ERYTHROCYTE [DISTWIDTH] IN BLOOD BY AUTOMATED COUNT: 12 % (ref 11.7–15.4)
HBA1C MFR BLD: 5.2 % (ref 4.8–5.6)
HBV SURFACE AG SERPL QL IA: NEGATIVE
HCT VFR BLD AUTO: 36.2 % (ref 34–46.6)
HCV IGG SERPL QL IA: NON REACTIVE
HGB BLD-MCNC: 11.9 G/DL (ref 11.1–15.9)
HIV 1+2 AB+HIV1 P24 AG SERPL QL IA: NON REACTIVE
MCH RBC QN AUTO: 30 PG (ref 26.6–33)
MCHC RBC AUTO-ENTMCNC: 32.9 G/DL (ref 31.5–35.7)
MCV RBC AUTO: 91 FL (ref 79–97)
PLATELET # BLD AUTO: 335 X10E3/UL (ref 150–450)
RBC # BLD AUTO: 3.97 X10E6/UL (ref 3.77–5.28)
RH BLD: NEGATIVE
RPR SER QL: NON REACTIVE
RUBV IGG SERPL IA-ACNC: 3.01 INDEX
WBC # BLD AUTO: 8.6 X10E3/UL (ref 3.4–10.8)

## 2025-02-04 LAB
CYTOLOGIST CVX/VAG CYTO: NORMAL
CYTOLOGY CVX/VAG DOC CYTO: NORMAL
CYTOLOGY CVX/VAG DOC THIN PREP: NORMAL
DX ICD CODE: NORMAL
HPV GENOTYPE REFLEX: NORMAL
HPV I/H RISK 4 DNA CVX QL PROBE+SIG AMP: NEGATIVE
Lab: NORMAL
OTHER STN SPEC: NORMAL
STAT OF ADQ CVX/VAG CYTO-IMP: NORMAL

## 2025-02-06 LAB
Lab: NORMAL
NTRA 22Q11.2 DELETION SYNDROME POPULATION-BASED RISK TEXT: NORMAL
NTRA 22Q11.2 DELETION SYNDROME RESULT TEXT: NORMAL
NTRA 22Q11.2 DELETION SYNDROME RISK SCORE TEXT: NORMAL
NTRA FETAL FRACTION: NORMAL
NTRA FETAL RHD SUMMARY: NORMAL
NTRA GENDER OF FETUS: NORMAL
NTRA MONOSOMY X AGE-BASED RISK TEXT: NORMAL
NTRA MONOSOMY X RESULT TEXT: NORMAL
NTRA MONOSOMY X RISK SCORE TEXT: NORMAL
NTRA TRIPLOIDY RESULT TEXT: NORMAL
NTRA TRISOMY 13 AGE-BASED RISK TEXT: NORMAL
NTRA TRISOMY 13 RESULT TEXT: NORMAL
NTRA TRISOMY 13 RISK SCORE TEXT: NORMAL
NTRA TRISOMY 18 AGE-BASED RISK TEXT: NORMAL
NTRA TRISOMY 18 RESULT TEXT: NORMAL
NTRA TRISOMY 18 RISK SCORE TEXT: NORMAL
NTRA TRISOMY 21 AGE-BASED RISK TEXT: NORMAL
NTRA TRISOMY 21 RESULT TEXT: NORMAL
NTRA TRISOMY 21 RISK SCORE TEXT: NORMAL

## 2025-02-28 ENCOUNTER — ROUTINE PRENATAL (OUTPATIENT)
Age: 35
End: 2025-02-28

## 2025-02-28 VITALS
RESPIRATION RATE: 16 BRPM | BODY MASS INDEX: 29.29 KG/M2 | DIASTOLIC BLOOD PRESSURE: 66 MMHG | HEART RATE: 83 BPM | WEIGHT: 176 LBS | SYSTOLIC BLOOD PRESSURE: 110 MMHG

## 2025-02-28 DIAGNOSIS — O26.899 RH NEGATIVE, ANTEPARTUM: ICD-10-CM

## 2025-02-28 DIAGNOSIS — Z67.91 RH NEGATIVE, ANTEPARTUM: ICD-10-CM

## 2025-02-28 DIAGNOSIS — Z3A.17 17 WEEKS GESTATION OF PREGNANCY: Primary | ICD-10-CM

## 2025-02-28 PROCEDURE — 0502F SUBSEQUENT PRENATAL CARE: CPT | Performed by: OBSTETRICS & GYNECOLOGY

## 2025-02-28 NOTE — PROGRESS NOTES
OB VISIT      Current EGA:   17w4d    Visit Notes:  Doing Well. No VB.     Total weight gain is 3.633 kg (8 lb 0.2 oz).   (Total expected for pregnancy is 7 kg (15 lb)-11.5 kg (25 lb))  Last Weight Metrics:      2025     2:47 PM 2025     3:20 PM 2025    12:20 PM 2024     2:15 PM 2024     8:38 AM 7/15/2024     2:57 PM 2024     1:39 PM   Weight Loss Metrics   Height  5' 5\" 5' 5\"  5' 2\" 5' 4.17\" 5' 4.17\"   Weight - Scale 176 lbs 168 lbs 168 lbs 10 oz 155 lbs 3 oz 147 lbs 152 lbs 3 oz 153 lbs 13 oz   BMI (Calculated) 0 kg/m2 28 kg/m2 28.1 kg/m2 28.4 kg/m2 26.9 kg/m2 26 kg/m2 26.3 kg/m2        Vitals:    25 1447   BP: 110/66   Pulse: 83   Resp: 16   Weight: 79.8 kg (176 lb)      Problem List:  Dating by 13 week US <> LMP  Anxiety/Depression - Buspar/Zoloft  H/O  x2  - Repeat 39w  Boy (normal NIPT)   Vit D Def 9.2 (5000)  Rh Neg  (Baby Rh Pos)    PN Flowsheet Data:  Prenatal Fetal Information  Fetal HR: 146  Movement: Absent Albumin: Negative  Glucose: Negative Comments: doing well       Current Outpatient Medications   Medication Instructions    busPIRone (BUSPAR) 5 mg, Oral, DAILY    folic acid (FOLVITE) 1 mg, Oral, DAILY    Prenatal MV-Min-Fe Fum-FA-DHA (PRENATAL MULTI +DHA) 27-0.8-200 MG CAPS 1 capsule, Oral, DAILY    sertraline (ZOLOFT) 50 mg, Oral, DAILY        Visit Diagnoses:   Diagnosis Orders   1. 17 weeks gestation of pregnancy        2. Rh negative, antepartum            Follow Up:  Return in about 3 weeks (around 3/21/2025) for Routine OB, OB Ultrasound.    Garrett Castillo MD, FACOG  25  3:09 PM

## 2025-03-19 DIAGNOSIS — Z3A.20 20 WEEKS GESTATION OF PREGNANCY: Primary | ICD-10-CM

## 2025-03-21 ENCOUNTER — ROUTINE PRENATAL (OUTPATIENT)
Age: 35
End: 2025-03-21

## 2025-03-21 VITALS
RESPIRATION RATE: 16 BRPM | WEIGHT: 176.6 LBS | SYSTOLIC BLOOD PRESSURE: 106 MMHG | DIASTOLIC BLOOD PRESSURE: 68 MMHG | HEART RATE: 77 BPM | BODY MASS INDEX: 29.39 KG/M2

## 2025-03-21 DIAGNOSIS — Z3A.20 20 WEEKS GESTATION OF PREGNANCY: Primary | ICD-10-CM

## 2025-03-21 NOTE — PROGRESS NOTES
US OB 14 PLUS WEEKS SINGLE OR FIRST GESTATION  Result Date: 3/21/2025  Transabdominal ultrasound images are reviewed today and demonstrate a braswell live intrauterine gestation in vertex presentation.  Amniotic fluid volume is normal with a maximum vertical pocket of 48 mm.  Placenta is noted to be anterior and is not low-lying.  Cord insertion is normal.  Average fetal biometric measurements today are consistent with 20 weeks and 3 days which confirms previous dating.  Her estimated fetal weight is 346 g (12 ounces) this is 31st percentile by Hadlock criteria.  Individual fetal biometric measurements are as follows: BPD (Hadlock) 4.93 cm 4.93 avg. 63.9% 20w6d OFD (HC) 6.30 cm 6.30 avg. HC (Hadlock) 18.09 cm 18.09 avg. 38.1% 20w4d AC (Hadlock) 15.57 cm 15.57 avg. 49.2% 20w5d FL (Hadlock) 3.13 cm 3.13 avg. 15.5% 19w5d Fetal anatomy appears normal with the exception of portions of the spine which were not well-visualized due to fetal position.  Sex does appear to be male. Recommend repeat scan in 4-8 weeks to reassess fetal anatomy of the spine.     
 x2  - Repeat 39w  Boy (normal NIPT)   Vit D Def 9.2 (5000)  Rh Neg  (Baby Rh Pos)  US 20w4d = 20w3d growth 31%  Poor views of spine -- repeat @28w    PN Flowsheet Data:  Prenatal Fetal Information  Fetal HR: U/S  Movement: Present Albumin: Negative  Glucose: Negative Comments: doing well       Current Outpatient Medications   Medication Instructions    busPIRone (BUSPAR) 5 mg, Oral, DAILY    folic acid (FOLVITE) 1 mg, Oral, DAILY    Prenatal MV-Min-Fe Fum-FA-DHA (PRENATAL MULTI +DHA) 27-0.8-200 MG CAPS 1 capsule, Oral, DAILY    sertraline (ZOLOFT) 50 mg, Oral, DAILY        Visit Diagnoses:   Diagnosis Orders   1. 20 weeks gestation of pregnancy            Follow Up:  Return in about 4 weeks (around 2025) for Routine OB.    Garrett Castillo MD, FACOG  25  2:06 PM

## 2025-04-18 ENCOUNTER — ROUTINE PRENATAL (OUTPATIENT)
Age: 35
End: 2025-04-18

## 2025-04-18 VITALS
SYSTOLIC BLOOD PRESSURE: 109 MMHG | RESPIRATION RATE: 18 BRPM | WEIGHT: 182.8 LBS | HEART RATE: 101 BPM | DIASTOLIC BLOOD PRESSURE: 68 MMHG | BODY MASS INDEX: 30.42 KG/M2

## 2025-04-18 DIAGNOSIS — Z3A.24 24 WEEKS GESTATION OF PREGNANCY: Primary | ICD-10-CM

## 2025-04-18 NOTE — PROGRESS NOTES
OB VISIT      Current EGA:   24w4d    Visit Notes:  Doing Well. +Fetal Movement.  No Ucs. No LOF or VB.     Total weight gain is 6.718 kg (14 lb 13 oz).   (Total expected for pregnancy is 7 kg (15 lb)-11.5 kg (25 lb))  Last Weight Metrics:      2025     3:50 PM 3/21/2025     1:52 PM 2025     2:47 PM 2025     3:20 PM 2025    12:20 PM 2024     2:15 PM 2024     8:38 AM   Weight Loss Metrics   Height    5' 5\" 5' 5\"  5' 2\"   Weight - Scale 182 lbs 13 oz 176 lbs 10 oz 176 lbs 168 lbs 168 lbs 10 oz 155 lbs 3 oz 147 lbs   BMI (Calculated) 0 kg/m2 0 kg/m2 0 kg/m2 28 kg/m2 28.1 kg/m2 28.4 kg/m2 26.9 kg/m2        Vitals:    25 1550   BP: 109/68   Pulse: (!) 101   Resp: 18   Weight: 82.9 kg (182 lb 12.8 oz)        Problem List:  Dating by 13 week US <> LMP  Anxiety/Depression - Buspar/Zoloft  H/O  x2  - Repeat 39w/  Worried about severe anxiety being awake during surgery.  Needs Ativan or similar during CS Discuss with anesthesia.   Boy (normal NIPT)   Vit D Def 9.2 (5000)  Rh Neg  (Baby Rh Pos)  US 20w4d = 20w3d growth 31%  Poor views of spine -- repeat @28w       PN Flowsheet Data:  Fundal Height (cm): 24 cm  Prenatal Fetal Information  Fundal Height (cm): 24 cm  Fetal HR: 155  Movement: Present Albumin: Negative  Glucose: Negative Comments: doing good       Current Outpatient Medications   Medication Instructions    busPIRone (BUSPAR) 5 mg, Oral, DAILY    folic acid (FOLVITE) 1 mg, Oral, DAILY    Prenatal MV-Min-Fe Fum-FA-DHA (PRENATAL MULTI +DHA) 27-0.8-200 MG CAPS 1 capsule, Oral, DAILY    sertraline (ZOLOFT) 50 mg, Oral, DAILY        Visit Diagnoses:   Diagnosis Orders   1. 24 weeks gestation of pregnancy            Follow Up:  Return in about 4 weeks (around 2025) for Glucola, Routine OB.    Garrett Castillo MD, FACOG  25  4:54 PM

## 2025-05-16 ENCOUNTER — ROUTINE PRENATAL (OUTPATIENT)
Age: 35
End: 2025-05-16

## 2025-05-16 VITALS
SYSTOLIC BLOOD PRESSURE: 110 MMHG | HEART RATE: 89 BPM | WEIGHT: 191.4 LBS | RESPIRATION RATE: 18 BRPM | DIASTOLIC BLOOD PRESSURE: 70 MMHG | BODY MASS INDEX: 31.85 KG/M2

## 2025-05-16 DIAGNOSIS — R79.89 LOW SERUM VITAMIN D: ICD-10-CM

## 2025-05-16 DIAGNOSIS — Z23 NEED FOR TDAP VACCINATION: ICD-10-CM

## 2025-05-16 DIAGNOSIS — O26.899 RH NEGATIVE STATE IN ANTEPARTUM PERIOD: ICD-10-CM

## 2025-05-16 DIAGNOSIS — Z67.91 RH NEGATIVE STATE IN ANTEPARTUM PERIOD: ICD-10-CM

## 2025-05-16 DIAGNOSIS — Z29.13 NEED FOR RHOGAM DUE TO RH NEGATIVE MOTHER: ICD-10-CM

## 2025-05-16 DIAGNOSIS — Z3A.28 28 WEEKS GESTATION OF PREGNANCY: Primary | ICD-10-CM

## 2025-05-16 DIAGNOSIS — N39.0 URINARY TRACT INFECTION WITHOUT HEMATURIA, SITE UNSPECIFIED: ICD-10-CM

## 2025-05-16 LAB
BLOOD BANK CMNT PATIENT-IMP: NORMAL
BLOOD GROUP ANTIBODIES SERPL: NORMAL

## 2025-05-16 RX ORDER — SULFAMETHOXAZOLE AND TRIMETHOPRIM 400; 80 MG/1; MG/1
1 TABLET ORAL 2 TIMES DAILY
Qty: 14 TABLET | Refills: 0 | Status: SHIPPED | OUTPATIENT
Start: 2025-05-16 | End: 2025-05-23

## 2025-05-16 NOTE — PROGRESS NOTES
US OB FOLLOW UP TRANSABDOMINAL APPROACH  Result Date: 5/16/2025  Transabdominal ultrasound images are reviewed today and demonstrate a braswell live intrauterine gestation in vertex presentation.  Active fetal cardiac activity is noted with fetal ventricular heart rate of 145 bpm.  Placenta is noted to be anterior grade 0.  Amniotic fluid volume is normal with a four-quadrant total of 16.5 cm.  Average fetal biometric measurements are consistent with 28 weeks and 3 days.  Estimated fetal weight is 1394 g (3 pounds 1 ounce) this is 69th percentile by Hadlock criteria.  Individual fetal biometric measurements are as follows: BPD (Hadlock) 7.70 cm 7.70 avg. 94.8% 30w6d OFD (HC) 9.73 cm 9.73 avg. HC (Hadlock) 27.84 cm 27.84 avg. 75.5% 30w3d AC (Hadlock) 25.20 cm 25.07 25.33 avg. 68.5% 29w3d FL (Hadlock) 5.47 cm 5.47 avg. 44.1% 28w6d Fetal anatomy was visualized today and demonstrates a normal spine all anatomy visualized was normal thus completing the fetal anatomic survey.

## 2025-05-16 NOTE — PROGRESS NOTES
OB VISIT      Current EGA:   28w4d    Visit Notes:  Doing Well. +Fetal Movement.  No Ucs. No LOF or VB.   Ultrasound was done today to evaluate these complaints and shows:  US OB FOLLOW UP TRANSABDOMINAL APPROACH  Result Date: 2025  Transabdominal ultrasound images are reviewed today and demonstrate a braswell live intrauterine gestation in vertex presentation.  Active fetal cardiac activity is noted with fetal ventricular heart rate of 145 bpm.  Placenta is noted to be anterior grade 0.  Amniotic fluid volume is normal with a four-quadrant total of 16.5 cm.  Average fetal biometric measurements are consistent with 28 weeks and 3 days.  Estimated fetal weight is 1394 g (3 pounds 1 ounce) this is 69th percentile by Hadlock criteria.  Individual fetal biometric measurements are as follows: BPD (Hadlock) 7.70 cm 7.70 avg. 94.8% 30w6d OFD (HC) 9.73 cm 9.73 avg. HC (Hadlock) 27.84 cm 27.84 avg. 75.5% 30w3d AC (Hadlock) 25.20 cm 25.07 25.33 avg. 68.5% 29w3d FL (Hadlock) 5.47 cm 5.47 avg. 44.1% 28w6d Fetal anatomy was visualized today and demonstrates a normal spine all anatomy visualized was normal thus completing the fetal anatomic survey.      For Rhogam, glucola, Tdap today    Total weight gain is 10.6 kg (23 lb 6.6 oz).   (Total expected for pregnancy is 7 kg (15 lb)-11.5 kg (25 lb))  Last Weight Metrics:      2025     2:34 PM 2025     3:50 PM 3/21/2025     1:52 PM 2025     2:47 PM 2025     3:20 PM 2025    12:20 PM 2024     2:15 PM   Weight Loss Metrics   Height     5' 5\" 5' 5\"    Weight - Scale 191 lbs 6 oz 182 lbs 13 oz 176 lbs 10 oz 176 lbs 168 lbs 168 lbs 10 oz 155 lbs 3 oz   BMI (Calculated) 0 kg/m2 0 kg/m2 0 kg/m2 0 kg/m2 28 kg/m2 28.1 kg/m2 28.4 kg/m2        Vitals:    25 1434   BP: 110/70   Pulse: 89   Resp: 18   Weight: 86.8 kg (191 lb 6.4 oz)        Problem List:  Dating by 13 week US <> LMP  Anxiety/Depression - Buspar/Zoloft  H/O  x2  - Repeat 39w/

## 2025-05-17 LAB
25(OH)D3 SERPL-MCNC: 35.6 NG/ML (ref 30–100)
ERYTHROCYTE [DISTWIDTH] IN BLOOD BY AUTOMATED COUNT: 12.5 % (ref 11.5–14.5)
GLUCOSE 1H P 100 G GLC PO SERPL-MCNC: 115 MG/DL (ref 65–140)
HCT VFR BLD AUTO: 31 % (ref 35–47)
HGB BLD-MCNC: 10.1 G/DL (ref 11.5–16)
MCH RBC QN AUTO: 29.7 PG (ref 26–34)
MCHC RBC AUTO-ENTMCNC: 32.6 G/DL (ref 30–36.5)
MCV RBC AUTO: 91.2 FL (ref 80–99)
NRBC # BLD: 0 K/UL (ref 0–0.01)
NRBC BLD-RTO: 0 PER 100 WBC
PLATELET # BLD AUTO: 361 K/UL (ref 150–400)
PMV BLD AUTO: 10.3 FL (ref 8.9–12.9)
RBC # BLD AUTO: 3.4 M/UL (ref 3.8–5.2)
WBC # BLD AUTO: 12.2 K/UL (ref 3.6–11)

## 2025-05-19 ENCOUNTER — RESULTS FOLLOW-UP (OUTPATIENT)
Age: 35
End: 2025-05-19

## 2025-05-19 PROBLEM — O99.019 ANEMIA OF PREGNANCY: Status: ACTIVE | Noted: 2025-05-19

## 2025-05-19 LAB — BACTERIA UR CULT: ABNORMAL

## 2025-06-18 ENCOUNTER — ROUTINE PRENATAL (OUTPATIENT)
Age: 35
End: 2025-06-18

## 2025-06-18 VITALS
SYSTOLIC BLOOD PRESSURE: 111 MMHG | HEART RATE: 96 BPM | BODY MASS INDEX: 32.28 KG/M2 | WEIGHT: 194 LBS | DIASTOLIC BLOOD PRESSURE: 69 MMHG

## 2025-06-18 DIAGNOSIS — O26.899 RH NEGATIVE, ANTEPARTUM: ICD-10-CM

## 2025-06-18 DIAGNOSIS — Z67.91 RH NEGATIVE, ANTEPARTUM: ICD-10-CM

## 2025-06-18 DIAGNOSIS — E55.9 VITAMIN D DEFICIENCY: ICD-10-CM

## 2025-06-18 DIAGNOSIS — O34.219 H/O CESAREAN SECTION COMPLICATING PREGNANCY: Primary | ICD-10-CM

## 2025-06-18 PROCEDURE — 0502F SUBSEQUENT PRENATAL CARE: CPT | Performed by: STUDENT IN AN ORGANIZED HEALTH CARE EDUCATION/TRAINING PROGRAM

## 2025-06-18 NOTE — PROGRESS NOTES
35yo  at 33w2d here for CHERELLE. Patient of [facility]. Pelvic pressure. Ucx LOLITA today. RTC in 2w with Dr. Castillo.

## 2025-06-20 ENCOUNTER — RESULTS FOLLOW-UP (OUTPATIENT)
Age: 35
End: 2025-06-20

## 2025-06-20 RX ORDER — CEPHALEXIN 500 MG/1
500 CAPSULE ORAL 4 TIMES DAILY
Qty: 28 CAPSULE | Refills: 0 | Status: SHIPPED | OUTPATIENT
Start: 2025-06-20 | End: 2025-06-27

## 2025-06-21 LAB
BACTERIA SPEC CULT: ABNORMAL
CC UR VC: ABNORMAL
SERVICE CMNT-IMP: ABNORMAL

## 2025-06-25 ENCOUNTER — TELEPHONE (OUTPATIENT)
Age: 35
End: 2025-06-25

## 2025-06-25 NOTE — CONSULTS
Session ID: [id number]  Session Duration: 2 minutes  Language: Belgian   ID: #[id number]   Name: Cierra Miller

## 2025-07-01 ENCOUNTER — ROUTINE PRENATAL (OUTPATIENT)
Age: 35
End: 2025-07-01

## 2025-07-01 VITALS
HEART RATE: 84 BPM | RESPIRATION RATE: 18 BRPM | SYSTOLIC BLOOD PRESSURE: 111 MMHG | DIASTOLIC BLOOD PRESSURE: 66 MMHG | WEIGHT: 198.2 LBS | BODY MASS INDEX: 32.98 KG/M2

## 2025-07-01 DIAGNOSIS — Z3A.35 35 WEEKS GESTATION OF PREGNANCY: Primary | ICD-10-CM

## 2025-07-01 PROCEDURE — 0502F SUBSEQUENT PRENATAL CARE: CPT | Performed by: OBSTETRICS & GYNECOLOGY

## 2025-07-01 RX ORDER — BREAST PUMP
1 EACH MISCELLANEOUS DAILY
Qty: 1 EACH | Refills: 0 | Status: SHIPPED | OUTPATIENT
Start: 2025-07-01 | End: 2026-07-01

## 2025-07-01 NOTE — PROGRESS NOTES
OB VISIT      Current EGA:   35w1d    Visit Notes:  Doing Well. +Fetal Movement.  No Ucs. No LOF or VB.   Lots of lower pelvic pressure.      Total weight gain is 13.7 kg (30 lb 3.4 oz).   (Total expected for pregnancy is 7 kg (15 lb)-11.5 kg (25 lb))  Last Weight Metrics:      2025     2:40 PM 2025     2:24 PM 2025     2:34 PM 2025     3:50 PM 3/21/2025     1:52 PM 2025     2:47 PM 2025     3:20 PM   Weight Loss Metrics   Height       5' 5\"   Weight - Scale 198 lbs 3 oz 194 lbs 191 lbs 6 oz 182 lbs 13 oz 176 lbs 10 oz 176 lbs 168 lbs   BMI (Calculated) 0 kg/m2 0 kg/m2 0 kg/m2 0 kg/m2 0 kg/m2 0 kg/m2 28 kg/m2        Vitals:    25 1440   BP: 111/66   Pulse: 84   Resp: 18   Weight: 89.9 kg (198 lb 3.2 oz)        Problem List:  Dating by 13 week US <> LMP  Anxiety/Depression - Buspar/Zoloft  H/O  x2  - Repeat 39w/  Worried about severe anxiety being awake during surgery.  Needs Ativan or similar during CS Discuss with anesthesia.  25?  Boy (normal NIPT)   Vit D Def 9.2 (5000)  Rh Neg  (Baby Rh Pos)  Rhogam @ 28wk (given 25)  US 20w4d = 20w3d growth 31%  Poor views of spine -- repeat @28w  Normal at 28wk  RESOLVED  US 28w4d = 29w6d Growth 69%    PN Flowsheet Data:  Fundal Height (cm): 35 cm  Prenatal Fetal Information  Fundal Height (cm): 35 cm  Fetal HR: 145  Movement: Present Albumin: Negative  Glucose: Negative Comments: pelvic pressure GBS Dilation (cm): Closed  Effacement: 0  Station: -3     Current Outpatient Medications   Medication Instructions    busPIRone (BUSPAR) 5 mg, Oral, DAILY    folic acid (FOLVITE) 1 mg, Oral, DAILY    Prenatal MV-Min-Fe Fum-FA-DHA (PRENATAL MULTI +DHA) 27-0.8-200 MG CAPS 1 capsule, Oral, DAILY    sertraline (ZOLOFT) 50 mg, Oral, DAILY        Visit Diagnoses:   Diagnosis Orders   1. 35 weeks gestation of pregnancy            Follow Up:  Return in about 2 weeks (around 7/15/2025) for Routine OB, OB Ultrasound.    Garrett Castillo,

## 2025-07-03 LAB — GP B STREP DNA SPEC QL NAA+PROBE: NEGATIVE

## 2025-07-14 ENCOUNTER — ROUTINE PRENATAL (OUTPATIENT)
Age: 35
End: 2025-07-14

## 2025-07-14 VITALS
DIASTOLIC BLOOD PRESSURE: 69 MMHG | SYSTOLIC BLOOD PRESSURE: 118 MMHG | OXYGEN SATURATION: 97 % | WEIGHT: 205.2 LBS | HEART RATE: 91 BPM | HEIGHT: 65 IN | RESPIRATION RATE: 16 BRPM | BODY MASS INDEX: 34.19 KG/M2

## 2025-07-14 DIAGNOSIS — Z3A.37 37 WEEKS GESTATION OF PREGNANCY: Primary | ICD-10-CM

## 2025-07-14 DIAGNOSIS — O26.843 UTERINE SIZE-DATE DISCREPANCY IN THIRD TRIMESTER: Primary | ICD-10-CM

## 2025-07-14 PROCEDURE — 0502F SUBSEQUENT PRENATAL CARE: CPT | Performed by: OBSTETRICS & GYNECOLOGY

## 2025-07-14 RX ORDER — BREAST PUMP
1 EACH MISCELLANEOUS DAILY
Qty: 1 EACH | Refills: 0 | Status: SHIPPED | OUTPATIENT
Start: 2025-07-14 | End: 2026-07-14

## 2025-07-14 SDOH — ECONOMIC STABILITY: FOOD INSECURITY: WITHIN THE PAST 12 MONTHS, YOU WORRIED THAT YOUR FOOD WOULD RUN OUT BEFORE YOU GOT MONEY TO BUY MORE.: NEVER TRUE

## 2025-07-14 SDOH — ECONOMIC STABILITY: FOOD INSECURITY: WITHIN THE PAST 12 MONTHS, THE FOOD YOU BOUGHT JUST DIDN'T LAST AND YOU DIDN'T HAVE MONEY TO GET MORE.: NEVER TRUE

## 2025-07-14 ASSESSMENT — PATIENT HEALTH QUESTIONNAIRE - PHQ9
SUM OF ALL RESPONSES TO PHQ QUESTIONS 1-9: 0
SUM OF ALL RESPONSES TO PHQ QUESTIONS 1-9: 0
2. FEELING DOWN, DEPRESSED OR HOPELESS: NOT AT ALL
1. LITTLE INTEREST OR PLEASURE IN DOING THINGS: NOT AT ALL
SUM OF ALL RESPONSES TO PHQ QUESTIONS 1-9: 0
SUM OF ALL RESPONSES TO PHQ QUESTIONS 1-9: 0

## 2025-07-14 NOTE — PROGRESS NOTES
OB VISIT      Current EGA:   37w0d    Visit Notes:  Doing Well. +Fetal Movement.  No Ucs. No LOF or VB.   Has us today showing  US OB FOLLOW UP TRANSABDOMINAL APPROACH  Result Date: 2025  Transabdominal ultrasound images are reviewed today and demonstrate a braswell live intrauterine gestation with active fetal cardiac activity.  Fetal ventricular heart rates 165 bpm.  Fetus is in vertex presentation.  There is an anterior placenta which is not low-lying. Amniotic fluid volume is normal with a four-quadrant total of 14.9 cm.  Average fetal biometric measurements today are consistent with 37 weeks and 5 days which confirms previous dating.  Estimated fetal weight is 3252 g (7 pounds 3 ounces) this is 71st percentile by Hadlock criteria.  Limited fetal anatomy is visualized and appears normal today         Total weight gain is 16.9 kg (37 lb 3.4 oz).   (Total expected for pregnancy is 7 kg (15 lb)-11.5 kg (25 lb))  Last Weight Metrics:      2025     3:16 PM 2025     2:40 PM 2025     2:24 PM 2025     2:34 PM 2025     3:50 PM 3/21/2025     1:52 PM 2025     2:47 PM   Weight Loss Metrics   Height 5' 5\"         Weight - Scale 205 lbs 3 oz 198 lbs 3 oz 194 lbs 191 lbs 6 oz 182 lbs 13 oz 176 lbs 10 oz 176 lbs   BMI (Calculated) 34.2 kg/m2 0 kg/m2 0 kg/m2 0 kg/m2 0 kg/m2 0 kg/m2 0 kg/m2        Vitals:    25 1516   BP: 118/69   Pulse: 91   Resp: 16   SpO2: 97%   Weight: 93.1 kg (205 lb 3.2 oz)   Height: 1.651 m (5' 5\")        Problem List:  Dating by 13 week US <> LMP  Anxiety/Depression - Buspar/Zoloft  H/O  x2  - Repeat 39w/  Worried about severe anxiety being awake during surgery.  Needs Ativan or similar during CS Discuss with anesthesia.  25 7:30am  Boy (normal NIPT)   Vit D Def 9.2 (5000)  Rh Neg  (Baby Rh Pos)  Rhogam @ 28wk (given 25)  US 20w4d = 20w3d growth 31%  Poor views of spine -- repeat @28w  Normal at 28wk  RESOLVED  US 28w4d = 29w6d Growth 69%  US

## 2025-07-21 ENCOUNTER — ROUTINE PRENATAL (OUTPATIENT)
Age: 35
End: 2025-07-21

## 2025-07-21 VITALS
HEART RATE: 105 BPM | BODY MASS INDEX: 34.95 KG/M2 | DIASTOLIC BLOOD PRESSURE: 74 MMHG | SYSTOLIC BLOOD PRESSURE: 115 MMHG | WEIGHT: 210 LBS

## 2025-07-21 DIAGNOSIS — Z67.91 RH NEGATIVE, ANTEPARTUM: ICD-10-CM

## 2025-07-21 DIAGNOSIS — Z3A.38 38 WEEKS GESTATION OF PREGNANCY: Primary | ICD-10-CM

## 2025-07-21 DIAGNOSIS — O99.019 ANTEPARTUM ANEMIA: ICD-10-CM

## 2025-07-21 DIAGNOSIS — E55.9 VITAMIN D DEFICIENCY: ICD-10-CM

## 2025-07-21 DIAGNOSIS — O26.899 RH NEGATIVE, ANTEPARTUM: ICD-10-CM

## 2025-07-21 DIAGNOSIS — O34.219 H/O CESAREAN SECTION COMPLICATING PREGNANCY: ICD-10-CM

## 2025-07-21 PROCEDURE — 0502F SUBSEQUENT PRENATAL CARE: CPT | Performed by: OBSTETRICS & GYNECOLOGY

## 2025-07-21 NOTE — PROGRESS NOTES
OB VISIT      Current EGA:   38w0d    Visit Notes:  Doing Well. +Fetal Movement.  No Ucs. No LOF or VB.   Has planned CS next Tue. Questions answered.    Total weight gain is 19.1 kg (42 lb 0.2 oz).   (Total expected for pregnancy is 7 kg (15 lb)-11.5 kg (25 lb))  Last Weight Metrics:      2025     3:18 PM 2025     3:16 PM 2025     2:40 PM 2025     2:24 PM 2025     2:34 PM 2025     3:50 PM 3/21/2025     1:52 PM   Weight Loss Metrics   Height  5' 5\"        Weight - Scale 210 lbs 205 lbs 3 oz 198 lbs 3 oz 194 lbs 191 lbs 6 oz 182 lbs 13 oz 176 lbs 10 oz   BMI (Calculated) 0 kg/m2 34.2 kg/m2 0 kg/m2 0 kg/m2 0 kg/m2 0 kg/m2 0 kg/m2        Vitals:    25 1518   BP: 115/74   Pulse: (!) 105   Weight: 95.3 kg (210 lb)        Problem List:  Dating by 13 week US <> LMP  Anxiety/Depression - Buspar/Zoloft  H/O  x2  - Repeat 39w/  Worried about severe anxiety being awake during surgery.  Needs Ativan or similar during CS Discuss with anesthesia.  25 7:30am  Boy (normal NIPT)   Vit D Def 9.2 (5000)  Rh Neg  (Baby Rh Pos)  Rhogam @ 28wk (given 25)  US 20w4d = 20w3d growth 31%  Poor views of spine -- repeat @28w  Normal at 28wk  RESOLVED  US 28w4d = 29w6d Growth 69%  US 37w0d = 37w5d Growth 71%    PN Flowsheet Data:  Fundal Height (cm): 39 cm  Prenatal Fetal Information  Fundal Height (cm): 39 cm  Fetal HR: 144  Movement: Present Albumin: Negative  Glucose: Negative Comments: No medical complaints Dilation (cm): Closed  Effacement: 0  Station: -3     Current Outpatient Medications   Medication Instructions    busPIRone (BUSPAR) 5 mg, DAILY    folic acid (FOLVITE) 1 mg, DAILY    Misc. Devices (BREAST PUMP) MISC 1 Units, Does not apply, DAILY    Misc. Devices (BREAST PUMP) MISC 1 Units, Does not apply, DAILY    Prenatal MV-Min-Fe Fum-FA-DHA (PRENATAL MULTI +DHA) 27-0.8-200 MG CAPS 1 capsule, Oral, DAILY    sertraline (ZOLOFT) 50 mg, DAILY        Visit Diagnoses:   Diagnosis

## 2025-07-29 ENCOUNTER — ANESTHESIA (OUTPATIENT)
Facility: HOSPITAL | Age: 35
End: 2025-07-29
Payer: MEDICAID

## 2025-07-29 ENCOUNTER — HOSPITAL ENCOUNTER (INPATIENT)
Facility: HOSPITAL | Age: 35
LOS: 2 days | Discharge: HOME | End: 2025-07-31
Attending: OBSTETRICS & GYNECOLOGY | Admitting: OBSTETRICS & GYNECOLOGY
Payer: MEDICAID

## 2025-07-29 ENCOUNTER — ANESTHESIA EVENT (OUTPATIENT)
Facility: HOSPITAL | Age: 35
End: 2025-07-29
Payer: MEDICAID

## 2025-07-29 DIAGNOSIS — G89.18 POST-OP PAIN: Primary | ICD-10-CM

## 2025-07-29 PROBLEM — Z3A.39 PREGNANCY WITH 39 COMPLETED WEEKS GESTATION: Status: ACTIVE | Noted: 2025-07-29

## 2025-07-29 LAB
ABO + RH BLD: NORMAL
BASOPHILS # BLD: 0.06 K/UL (ref 0–0.1)
BASOPHILS NFR BLD: 0.6 % (ref 0–1)
BLOOD GROUP ANTIBODIES SERPL: NORMAL
COMMENT:: NORMAL
DIFFERENTIAL METHOD BLD: ABNORMAL
EOSINOPHIL # BLD: 0.09 K/UL (ref 0–0.4)
EOSINOPHIL NFR BLD: 0.9 % (ref 0–7)
ERYTHROCYTE [DISTWIDTH] IN BLOOD BY AUTOMATED COUNT: 12.8 % (ref 11.5–14.5)
HCT VFR BLD AUTO: 32.3 % (ref 35–47)
HGB BLD-MCNC: 10.9 G/DL (ref 11.5–16)
IMM GRANULOCYTES # BLD AUTO: 0.21 K/UL (ref 0–0.04)
IMM GRANULOCYTES NFR BLD AUTO: 2.1 % (ref 0–0.5)
LYMPHOCYTES # BLD: 2.43 K/UL (ref 0.8–3.5)
LYMPHOCYTES NFR BLD: 23.8 % (ref 12–49)
MCH RBC QN AUTO: 29.7 PG (ref 26–34)
MCHC RBC AUTO-ENTMCNC: 33.7 G/DL (ref 30–36.5)
MCV RBC AUTO: 88 FL (ref 80–99)
MONOCYTES # BLD: 0.78 K/UL (ref 0–1)
MONOCYTES NFR BLD: 7.6 % (ref 5–13)
NEUTS SEG # BLD: 6.63 K/UL (ref 1.8–8)
NEUTS SEG NFR BLD: 65 % (ref 32–75)
NRBC # BLD: 0 K/UL (ref 0–0.01)
NRBC BLD-RTO: 0 PER 100 WBC
PLATELET # BLD AUTO: 293 K/UL (ref 150–400)
PMV BLD AUTO: 11.1 FL (ref 8.9–12.9)
RBC # BLD AUTO: 3.67 M/UL (ref 3.8–5.2)
RBC MORPH BLD: ABNORMAL
SPECIMEN EXP DATE BLD: NORMAL
SPECIMEN HOLD: NORMAL
WBC # BLD AUTO: 10.2 K/UL (ref 3.6–11)

## 2025-07-29 PROCEDURE — 2500000003 HC RX 250 WO HCPCS: Performed by: OBSTETRICS & GYNECOLOGY

## 2025-07-29 PROCEDURE — 6360000002 HC RX W HCPCS: Performed by: OBSTETRICS & GYNECOLOGY

## 2025-07-29 PROCEDURE — 3609079900 HC CESAREAN SECTION: Performed by: OBSTETRICS & GYNECOLOGY

## 2025-07-29 PROCEDURE — 7100000000 HC PACU RECOVERY - FIRST 15 MIN: Performed by: OBSTETRICS & GYNECOLOGY

## 2025-07-29 PROCEDURE — 94761 N-INVAS EAR/PLS OXIMETRY MLT: CPT

## 2025-07-29 PROCEDURE — 2709999900 HC NON-CHARGEABLE SUPPLY: Performed by: OBSTETRICS & GYNECOLOGY

## 2025-07-29 PROCEDURE — 6360000002 HC RX W HCPCS

## 2025-07-29 PROCEDURE — 36415 COLL VENOUS BLD VENIPUNCTURE: CPT

## 2025-07-29 PROCEDURE — 85025 COMPLETE CBC W/AUTO DIFF WBC: CPT

## 2025-07-29 PROCEDURE — 59510 CESAREAN DELIVERY: CPT | Performed by: OBSTETRICS & GYNECOLOGY

## 2025-07-29 PROCEDURE — 2580000003 HC RX 258: Performed by: NURSE ANESTHETIST, CERTIFIED REGISTERED

## 2025-07-29 PROCEDURE — 6370000000 HC RX 637 (ALT 250 FOR IP): Performed by: OBSTETRICS & GYNECOLOGY

## 2025-07-29 PROCEDURE — 2720000010 HC SURG SUPPLY STERILE: Performed by: OBSTETRICS & GYNECOLOGY

## 2025-07-29 PROCEDURE — 86780 TREPONEMA PALLIDUM: CPT

## 2025-07-29 PROCEDURE — 3700000000 HC ANESTHESIA ATTENDED CARE: Performed by: OBSTETRICS & GYNECOLOGY

## 2025-07-29 PROCEDURE — 7100000001 HC PACU RECOVERY - ADDTL 15 MIN: Performed by: OBSTETRICS & GYNECOLOGY

## 2025-07-29 PROCEDURE — 1120000000 HC RM PRIVATE OB

## 2025-07-29 PROCEDURE — 86900 BLOOD TYPING SEROLOGIC ABO: CPT

## 2025-07-29 PROCEDURE — 86901 BLOOD TYPING SEROLOGIC RH(D): CPT

## 2025-07-29 PROCEDURE — 86850 RBC ANTIBODY SCREEN: CPT

## 2025-07-29 PROCEDURE — 6360000002 HC RX W HCPCS: Performed by: NURSE ANESTHETIST, CERTIFIED REGISTERED

## 2025-07-29 PROCEDURE — 51702 INSERT TEMP BLADDER CATH: CPT

## 2025-07-29 PROCEDURE — 3700000001 HC ADD 15 MINUTES (ANESTHESIA): Performed by: OBSTETRICS & GYNECOLOGY

## 2025-07-29 PROCEDURE — 2580000003 HC RX 258: Performed by: OBSTETRICS & GYNECOLOGY

## 2025-07-29 RX ORDER — KETOROLAC TROMETHAMINE 30 MG/ML
30 INJECTION, SOLUTION INTRAMUSCULAR; INTRAVENOUS PRN
Status: DISCONTINUED | OUTPATIENT
Start: 2025-07-29 | End: 2025-07-29 | Stop reason: HOSPADM

## 2025-07-29 RX ORDER — SWAB
1 SWAB, NON-MEDICATED MISCELLANEOUS DAILY
Status: DISCONTINUED | OUTPATIENT
Start: 2025-07-29 | End: 2025-07-31 | Stop reason: HOSPADM

## 2025-07-29 RX ORDER — DIPHENHYDRAMINE HYDROCHLORIDE 50 MG/ML
25 INJECTION, SOLUTION INTRAMUSCULAR; INTRAVENOUS EVERY 6 HOURS PRN
Status: DISCONTINUED | OUTPATIENT
Start: 2025-07-29 | End: 2025-07-31 | Stop reason: HOSPADM

## 2025-07-29 RX ORDER — SODIUM CHLORIDE 0.9 % (FLUSH) 0.9 %
5-40 SYRINGE (ML) INJECTION EVERY 12 HOURS SCHEDULED
Status: DISCONTINUED | OUTPATIENT
Start: 2025-07-29 | End: 2025-07-31 | Stop reason: HOSPADM

## 2025-07-29 RX ORDER — FAMOTIDINE 20 MG/1
20 TABLET, FILM COATED ORAL 2 TIMES DAILY PRN
Status: DISCONTINUED | OUTPATIENT
Start: 2025-07-29 | End: 2025-07-31 | Stop reason: HOSPADM

## 2025-07-29 RX ORDER — CITRIC ACID/SODIUM CITRATE 334-500MG
30 SOLUTION, ORAL ORAL ONCE
Status: DISCONTINUED | OUTPATIENT
Start: 2025-07-29 | End: 2025-07-31 | Stop reason: HOSPADM

## 2025-07-29 RX ORDER — DIPHENHYDRAMINE HYDROCHLORIDE 50 MG/ML
12.5 INJECTION, SOLUTION INTRAMUSCULAR; INTRAVENOUS ONCE
Status: DISCONTINUED | OUTPATIENT
Start: 2025-07-29 | End: 2025-07-29

## 2025-07-29 RX ORDER — SODIUM CHLORIDE 9 MG/ML
INJECTION, SOLUTION INTRAVENOUS PRN
Status: DISCONTINUED | OUTPATIENT
Start: 2025-07-29 | End: 2025-07-31 | Stop reason: HOSPADM

## 2025-07-29 RX ORDER — ONDANSETRON 2 MG/ML
INJECTION INTRAMUSCULAR; INTRAVENOUS
Status: DISCONTINUED | OUTPATIENT
Start: 2025-07-29 | End: 2025-07-29 | Stop reason: SDUPTHER

## 2025-07-29 RX ORDER — BUPIVACAINE HYDROCHLORIDE 7.5 MG/ML
INJECTION, SOLUTION INTRASPINAL
Status: DISCONTINUED | OUTPATIENT
Start: 2025-07-29 | End: 2025-07-29 | Stop reason: SDUPTHER

## 2025-07-29 RX ORDER — FAMOTIDINE 10 MG/ML
INJECTION, SOLUTION INTRAVENOUS
Status: DISCONTINUED | OUTPATIENT
Start: 2025-07-29 | End: 2025-07-29 | Stop reason: SDUPTHER

## 2025-07-29 RX ORDER — OXYTOCIN 10 [USP'U]/ML
INJECTION, SOLUTION INTRAMUSCULAR; INTRAVENOUS
Status: DISCONTINUED | OUTPATIENT
Start: 2025-07-29 | End: 2025-07-29 | Stop reason: SDUPTHER

## 2025-07-29 RX ORDER — ONDANSETRON 2 MG/ML
4 INJECTION INTRAMUSCULAR; INTRAVENOUS EVERY 6 HOURS PRN
Status: DISCONTINUED | OUTPATIENT
Start: 2025-07-29 | End: 2025-07-29 | Stop reason: SDUPTHER

## 2025-07-29 RX ORDER — METHYLERGONOVINE MALEATE 0.2 MG/ML
200 INJECTION INTRAVENOUS PRN
Status: DISCONTINUED | OUTPATIENT
Start: 2025-07-29 | End: 2025-07-31 | Stop reason: HOSPADM

## 2025-07-29 RX ORDER — FAMOTIDINE 10 MG/ML
20 INJECTION, SOLUTION INTRAVENOUS ONCE
Status: DISCONTINUED | OUTPATIENT
Start: 2025-07-29 | End: 2025-07-29 | Stop reason: SDUPTHER

## 2025-07-29 RX ORDER — SODIUM CHLORIDE, SODIUM LACTATE, POTASSIUM CHLORIDE, CALCIUM CHLORIDE 600; 310; 30; 20 MG/100ML; MG/100ML; MG/100ML; MG/100ML
INJECTION, SOLUTION INTRAVENOUS CONTINUOUS
Status: DISCONTINUED | OUTPATIENT
Start: 2025-07-29 | End: 2025-07-29 | Stop reason: SDUPTHER

## 2025-07-29 RX ORDER — LIDOCAINE HYDROCHLORIDE 20 MG/ML
INJECTION, SOLUTION EPIDURAL; INFILTRATION; INTRACAUDAL; PERINEURAL
Status: DISCONTINUED | OUTPATIENT
Start: 2025-07-29 | End: 2025-07-29 | Stop reason: SDUPTHER

## 2025-07-29 RX ORDER — ONDANSETRON 4 MG/1
4 TABLET, ORALLY DISINTEGRATING ORAL EVERY 8 HOURS PRN
Status: DISCONTINUED | OUTPATIENT
Start: 2025-07-29 | End: 2025-07-31 | Stop reason: HOSPADM

## 2025-07-29 RX ORDER — MISOPROSTOL 200 UG/1
800 TABLET ORAL PRN
Status: DISCONTINUED | OUTPATIENT
Start: 2025-07-29 | End: 2025-07-31 | Stop reason: HOSPADM

## 2025-07-29 RX ORDER — SENNA AND DOCUSATE SODIUM 50; 8.6 MG/1; MG/1
1 TABLET, FILM COATED ORAL DAILY
Status: DISCONTINUED | OUTPATIENT
Start: 2025-07-29 | End: 2025-07-31 | Stop reason: HOSPADM

## 2025-07-29 RX ORDER — SODIUM CHLORIDE 0.9 % (FLUSH) 0.9 %
5-40 SYRINGE (ML) INJECTION PRN
Status: DISCONTINUED | OUTPATIENT
Start: 2025-07-29 | End: 2025-07-31 | Stop reason: HOSPADM

## 2025-07-29 RX ORDER — ONDANSETRON 2 MG/ML
4 INJECTION INTRAMUSCULAR; INTRAVENOUS EVERY 6 HOURS PRN
Status: DISCONTINUED | OUTPATIENT
Start: 2025-07-29 | End: 2025-07-31 | Stop reason: HOSPADM

## 2025-07-29 RX ORDER — DIPHENHYDRAMINE HYDROCHLORIDE 50 MG/ML
25 INJECTION, SOLUTION INTRAMUSCULAR; INTRAVENOUS ONCE
Status: DISCONTINUED | OUTPATIENT
Start: 2025-07-29 | End: 2025-07-29

## 2025-07-29 RX ORDER — ACETAMINOPHEN 325 MG/1
975 TABLET ORAL ONCE
Status: DISCONTINUED | OUTPATIENT
Start: 2025-07-29 | End: 2025-07-29

## 2025-07-29 RX ORDER — SIMETHICONE 80 MG
80 TABLET,CHEWABLE ORAL EVERY 6 HOURS PRN
Status: DISCONTINUED | OUTPATIENT
Start: 2025-07-29 | End: 2025-07-31 | Stop reason: HOSPADM

## 2025-07-29 RX ORDER — KETOROLAC TROMETHAMINE 30 MG/ML
30 INJECTION, SOLUTION INTRAMUSCULAR; INTRAVENOUS EVERY 6 HOURS
Status: COMPLETED | OUTPATIENT
Start: 2025-07-29 | End: 2025-07-30

## 2025-07-29 RX ORDER — OXYCODONE HYDROCHLORIDE 5 MG/1
10 TABLET ORAL EVERY 4 HOURS PRN
Status: DISCONTINUED | OUTPATIENT
Start: 2025-07-29 | End: 2025-07-31 | Stop reason: HOSPADM

## 2025-07-29 RX ORDER — LOPERAMIDE HYDROCHLORIDE 2 MG/1
2 CAPSULE ORAL PRN
Status: DISCONTINUED | OUTPATIENT
Start: 2025-07-29 | End: 2025-07-31 | Stop reason: HOSPADM

## 2025-07-29 RX ORDER — SODIUM CHLORIDE, SODIUM LACTATE, POTASSIUM CHLORIDE, AND CALCIUM CHLORIDE .6; .31; .03; .02 G/100ML; G/100ML; G/100ML; G/100ML
1000 INJECTION, SOLUTION INTRAVENOUS ONCE
Status: DISCONTINUED | OUTPATIENT
Start: 2025-07-29 | End: 2025-07-31 | Stop reason: HOSPADM

## 2025-07-29 RX ORDER — OXYCODONE HYDROCHLORIDE 5 MG/1
5 TABLET ORAL EVERY 4 HOURS PRN
Status: DISCONTINUED | OUTPATIENT
Start: 2025-07-29 | End: 2025-07-31 | Stop reason: HOSPADM

## 2025-07-29 RX ORDER — FENTANYL CITRATE 50 UG/ML
INJECTION, SOLUTION INTRAMUSCULAR; INTRAVENOUS
Status: DISCONTINUED | OUTPATIENT
Start: 2025-07-29 | End: 2025-07-29 | Stop reason: SDUPTHER

## 2025-07-29 RX ORDER — ACETAMINOPHEN 500 MG
1000 TABLET ORAL EVERY 8 HOURS SCHEDULED
Status: DISCONTINUED | OUTPATIENT
Start: 2025-07-29 | End: 2025-07-31 | Stop reason: HOSPADM

## 2025-07-29 RX ORDER — MORPHINE SULFATE 1 MG/ML
INJECTION, SOLUTION EPIDURAL; INTRATHECAL; INTRAVENOUS
Status: DISCONTINUED | OUTPATIENT
Start: 2025-07-29 | End: 2025-07-29 | Stop reason: SDUPTHER

## 2025-07-29 RX ORDER — SODIUM CHLORIDE, SODIUM LACTATE, POTASSIUM CHLORIDE, CALCIUM CHLORIDE 600; 310; 30; 20 MG/100ML; MG/100ML; MG/100ML; MG/100ML
INJECTION, SOLUTION INTRAVENOUS CONTINUOUS
Status: DISCONTINUED | OUTPATIENT
Start: 2025-07-29 | End: 2025-07-31 | Stop reason: HOSPADM

## 2025-07-29 RX ORDER — DIPHENHYDRAMINE HYDROCHLORIDE 50 MG/ML
INJECTION, SOLUTION INTRAMUSCULAR; INTRAVENOUS
Status: COMPLETED
Start: 2025-07-29 | End: 2025-07-29

## 2025-07-29 RX ORDER — IBUPROFEN 800 MG/1
800 TABLET, FILM COATED ORAL EVERY 8 HOURS
Status: DISCONTINUED | OUTPATIENT
Start: 2025-07-30 | End: 2025-07-31 | Stop reason: HOSPADM

## 2025-07-29 RX ADMIN — MORPHINE SULFATE 0.15 MG: 1 INJECTION, SOLUTION EPIDURAL; INTRATHECAL; INTRAVENOUS at 12:43

## 2025-07-29 RX ADMIN — FAMOTIDINE 20 MG: 10 INJECTION, SOLUTION INTRAVENOUS at 12:24

## 2025-07-29 RX ADMIN — Medication 1 TABLET: at 20:32

## 2025-07-29 RX ADMIN — SODIUM CHLORIDE, POTASSIUM CHLORIDE, SODIUM LACTATE AND CALCIUM CHLORIDE: 600; 310; 30; 20 INJECTION, SOLUTION INTRAVENOUS at 12:20

## 2025-07-29 RX ADMIN — CEFAZOLIN SODIUM 2000 MG: 1 POWDER, FOR SOLUTION INTRAMUSCULAR; INTRAVENOUS at 12:41

## 2025-07-29 RX ADMIN — FENTANYL CITRATE 10 MCG: 50 INJECTION, SOLUTION INTRAMUSCULAR; INTRAVENOUS at 12:43

## 2025-07-29 RX ADMIN — KETOROLAC TROMETHAMINE 30 MG: 30 INJECTION, SOLUTION INTRAMUSCULAR at 20:33

## 2025-07-29 RX ADMIN — DIPHENHYDRAMINE HYDROCHLORIDE 25 MG: 50 INJECTION INTRAMUSCULAR; INTRAVENOUS at 15:59

## 2025-07-29 RX ADMIN — LIDOCAINE HYDROCHLORIDE 1 ML: 20 INJECTION, SOLUTION EPIDURAL; INFILTRATION; INTRACAUDAL; PERINEURAL at 12:41

## 2025-07-29 RX ADMIN — BUPIVACAINE HYDROCHLORIDE IN DEXTROSE 1.6 ML: 7.5 INJECTION, SOLUTION SUBARACHNOID at 12:43

## 2025-07-29 RX ADMIN — DIPHENHYDRAMINE HYDROCHLORIDE 25 MG: 50 INJECTION INTRAMUSCULAR; INTRAVENOUS at 22:41

## 2025-07-29 RX ADMIN — ONDANSETRON 4 MG: 2 INJECTION, SOLUTION INTRAMUSCULAR; INTRAVENOUS at 12:24

## 2025-07-29 RX ADMIN — KETOROLAC TROMETHAMINE 30 MG: 30 INJECTION, SOLUTION INTRAMUSCULAR at 15:29

## 2025-07-29 RX ADMIN — PHENYLEPHRINE HYDROCHLORIDE 40 MCG/MIN: 10 INJECTION INTRAVENOUS at 12:44

## 2025-07-29 RX ADMIN — ACETAMINOPHEN 1000 MG: 500 TABLET ORAL at 16:31

## 2025-07-29 RX ADMIN — ONDANSETRON 4 MG: 2 INJECTION, SOLUTION INTRAMUSCULAR; INTRAVENOUS at 16:27

## 2025-07-29 RX ADMIN — OXYTOCIN 30 UNITS: 10 INJECTION, SOLUTION INTRAMUSCULAR; INTRAVENOUS at 12:59

## 2025-07-29 ASSESSMENT — PAIN SCALES - GENERAL
PAINLEVEL_OUTOF10: 6
PAINLEVEL_OUTOF10: 5

## 2025-07-29 ASSESSMENT — PAIN DESCRIPTION - ORIENTATION: ORIENTATION: ANTERIOR

## 2025-07-29 ASSESSMENT — PAIN DESCRIPTION - LOCATION
LOCATION: ABDOMEN
LOCATION: ABDOMEN

## 2025-07-29 ASSESSMENT — PAIN - FUNCTIONAL ASSESSMENT: PAIN_FUNCTIONAL_ASSESSMENT: ACTIVITIES ARE NOT PREVENTED

## 2025-07-29 ASSESSMENT — PAIN DESCRIPTION - DESCRIPTORS: DESCRIPTORS: SORE

## 2025-07-29 NOTE — ANESTHESIA PRE PROCEDURE
Department of Anesthesiology  Preprocedure Note       Name:  Paul Jacob   Age:  34 y.o.  :  1990                                          MRN:  [id number]         Date:  2025      Surgeon: Surgeon(s):  Garrett Castillo II, MD    Procedure: Procedure(s):   SECTION    Medications prior to admission:   Prior to Admission medications    Medication Sig Start Date End Date Taking? Authorizing Provider   folic acid (FOLVITE) 1 MG tablet Take 1 tablet by mouth daily 1/3/25  Yes Provider, MD Rahel   sertraline (ZOLOFT) 50 MG tablet Take 1 tablet by mouth daily 24  Yes ProviderRahel MD   busPIRone (BUSPAR) 5 MG tablet Take 1 tablet by mouth daily 24  Yes Provider, Historical, MD   Misc. Devices (BREAST PUMP) MISC 1 Units by Does not apply route daily  Patient not taking: Reported on 2025  Garrett Castillo II, MD Misc. Devices (BREAST PUMP) MISC 1 Units by Does not apply route daily  Patient not taking: Reported on 2025  Garrett Castillo II, MD   Prenatal MV-Min-Fe Fum-FA-DHA (PRENATAL MULTI +DHA) 27-0.8-200 MG CAPS Take 1 capsule by mouth daily 25  Garrett Castillo II, MD       Current medications:    Current Facility-Administered Medications   Medication Dose Route Frequency Provider Last Rate Last Admin   • ceFAZolin (ANCEF) 2,000 mg in sterile water 20 mL IV syringe  2,000 mg IntraVENous Q8H Garrett Castillo II, MD       • lactated ringers infusion   IntraVENous Continuous Garrett Castillo II, MD       • lactated ringers bolus 1,000 mL  1,000 mL IntraVENous Once Garrett Castillo II, MD           Allergies:  No Known Allergies    Problem List:    Patient Active Problem List   Diagnosis Code   • Generalized anxiety disorder with panic attacks F41.1, F41.0   • H/O  section complicating pregnancy O34.219   • Vitamin D deficiency E55.9   • Rh negative, antepartum O26.899, Z67.91   • Anemia of pregnancy O99.019       Past Medical  Radiation Oncology Follow-Up


Date of Visit


Nov 16, 2017.





Reason For Visit


6 month follow-up





Radiation Completion Date


4/5/17





Diagnosis





(1) Intraductal carcinoma of left breast


Status:  Resolved        Onset Date:  11/18/2016


Stage:  0


Permanent Comment:  STAGING: Left breast, DCIS, grade 3, comedonecrosis, ER/WI 

positive





Abnormal left breast mammogram followed with serial mammography


Status post stereotactic biopsy 11/18/2016 revealing DCIS


Estrogen receptor positive and progesterone receptor positive


Status post MRI then MRI guided stereotactic biopsy 12/16/2016 benign


Status post seed localization segmental mastectomy 01/09/2017


Stage pTis NX MX


Status post completion of radiation therapy 04/05/2017.  She received 6640 cGy  

Last Edited By: Rafaela Holguin on Apr 17, 2017 10:23





History of Present Illness


Ms. Sommer presented with a abnormal mammogram on 05/05/2016 which revealed a 

small 3 mm cluster in the left upper outer quadrant that were benign-appearing; 

the recommendation was for a repeat mammogram in 6 months.  The patient did 

have a repeat unilateral left diagnostic mammogram on 11/14/2016 which revealed 

2 small faint clusters of microcalcifications in the upper outer quadrant of 

the left breast.  The patient underwent a stereotactic biopsy on 11/18/2016 the 

left breast which revealed ductal carcinoma in situ that was grade 3 with focal 

comedonecrosis.  The tumor was identified is estrogen receptor positive and 

progesterone receptor positive.  The patient subsequently had a bilateral MRI 

of the breasts on 12/14/2016 which revealed biopsy site changes in the left 

breast 1 o'clock position as expected and no other evidence of disease.  The 

report did mention a second cluster of faint amorphous microcalcifications and 

recommended a second biopsy which was completed on 12/16/2016 and only showed 

fibrocystic changes with calcifications and no evidence of cancer.  The patient 

subsequently was seen at [facility] and underwent a lumpectomy on 01/09/ 2017.  Pathology revealed ductal carcinoma in situ, micropapillary type, 

nuclear grade 3 with comedonecrosis and associated calcification.  The DCIS 

measured up to 5 mm.  The margins were free of resection however the closest 

margin was 1.5 mm and was the anterior margin.  The patient was seen by medical 

oncology who did recommend anti-hormonal therapy due to the positive estrogen 

receptor status.  We are now seeing the patient in consultation discuss the 

role of adjuvant radiation therapy.





She underwent conventional radiation therapy.  Radiation was completed 04/05/ 2017.  She received received 6640 cGy.





Interim History


She has been doing well in regards to her breast.  She is noted no masses or 

tenderness and no change of the axilla.  She's had no swelling of her arm.  She 

is up-to-date on mammography.  She is followed closely by her breast surgeon 

and medical oncologist.  She does have a feeling of tightness in the left 

shoulder.  She feels there is a decrease in range of motion on flexion and 

extension.  There continues to be some mild swelling along the lower quadrants 

of the breast.  She was instructed to use a compression bra.  She was wearing 

this at night.  This seemed to cause increased discomfort and she has stopped 

using the compression bra.  She denies pain while sitting still.  She can have 

some discomfort up to level 3-4 with stretching and range of motion.  She has 

been trying to do stretching exercises at night.





Allergies


Coded Allergies:  


     No Known Allergies (Unverified , 2/7/17)





Home Medications


Scheduled


Levothyroxine Sodium (Synthroid), 1 TAB PO DAILY


Levothyroxine Sodium (Levothyroxine Sodium), 1.5 TAB PO AS DIRECTED


Tamoxifen (Nolvadex), 20 MG PO DAILY


[calcium], DAILY


[vitamin d], DAILY





Review of Systems


Gastrointestinal:  


   Symptoms:  WNL


Oral:  


   Symptoms:  No Problems


Respiratory:  


   Symptoms:  WNL


Skin:  


   Symptoms:  No Problems


Breast:  


   Right Upper Arm Measurement:  28.0


   Right Mid Arm Measurement:  24.0


   Right Wrist Measurement:  15.5


   Left Upper Arm Measurement:  28.5


   Left Mid Arm Measurement:  23.5


   Left Wrist Measurement:  15.0


   Arm Dominence:  Right





Physical Exam





Vital Signs








  Date Time  Temp Pulse Resp B/P (MAP) Pulse Ox O2 Delivery O2 Flow Rate FiO2


 


11/16/17 14:31 36.9 79 18 134/94 100   








Fatigue:  None


General Appearance:  no apparent distress


Eyes:  normal inspection, EOMI


ENT:  normal ENT inspection, hearing grossly normal


Neck:  no adenopathy, thyroid normal


Respiratory/Chest:  lungs clear, no respiratory distress, no accessory muscle 

use


Breast:


Breast examination reveals well-healed incisions of the left breast.  There is 

slight hyperpigmentation.  There is very minimal edema.  There are no masses or 

tenderness no axillary adenopathy.  Using the Valentine score cosmesis she has a 

good outcome.  The right breast showed no masses or tenderness and no axillary 

adenopathy.


Cardiovascular:  regular rate, rhythm, no gallop, no murmur


Abdomen:  non tender, soft, no organomegaly


Extremities:  normal inspection, + pertinent finding (normal range of motion of 

the shoulders.)


Neurologic/Psychiatric:  no motor/sensory deficits, alert, normal mood/affect





Pain Management


Pain Duration:  since radiation


Pain Location:  None


Patient Preferred Pain Scale:  0 - 10


Pain Rating (0-10):  4


Additional Pain Description:  tightness


Pain Management Plan


Discomfort occurs with range of motion.  There is no pain in the arm is down.





Laboratory Studies











Test


  9/14/17


07:39


 


White Blood Count


  5.64 K/uL


(4.8-10.8)


 


Red Blood Count


  4.50 M/uL


(4.2-5.4)


 


Hemoglobin


  12.8 g/dL


(12.0-16.0)


 


Hematocrit 40.9 % (37-47) 


 


Mean Corpuscular Volume


  90.9 fL


()


 


Mean Corpuscular Hemoglobin


  28.4 pg


(25-34)


 


Mean Corpuscular Hemoglobin


Concent 31.3 g/dl


(32-36)


 


Platelet Count


  280 K/uL


(130-400)


 


Mean Platelet Volume


  10.0 fL


(7.4-10.4)


 


Neutrophils (%) (Auto) 72.7 % 


 


Lymphocytes (%) (Auto) 16.3 % 


 


Monocytes (%) (Auto) 9.2 % 


 


Eosinophils (%) (Auto) 1.4 % 


 


Basophils (%) (Auto) 0.2 % 


 


Neutrophils # (Auto)


  4.10 K/uL


(1.4-6.5)


 


Lymphocytes # (Auto)


  0.92 K/uL


(1.2-3.4)


 


Monocytes # (Auto)


  0.52 K/uL


(0.11-0.59)


 


Eosinophils # (Auto)


  0.08 K/uL


(0-0.5)


 


Basophils # (Auto)


  0.01 K/uL


(0-0.2)


 


RDW Standard Deviation


  42.0 fL


(36.4-46.3)


 


RDW Coefficient of Variation


  12.7 %


(11.5-14.5)


 


Immature Granulocyte % (Auto) 0.2 % 


 


Immature Granulocyte # (Auto)


  0.01 K/uL


(0.00-0.02)


 


Sodium Level


  142 mmol/L


(136-145)


 


Potassium Level


  4.3 mmol/L


(3.5-5.1)


 


Chloride Level


  108 mmol/L


()


 


Carbon Dioxide Level


  30 mmol/L


(21-32)


 


Anion Gap


  4.0 mmol/L


(3-11)


 


Blood Urea Nitrogen


  14 mg/dl


(7-18)


 


Creatinine


  0.83 mg/dl


(0.60-1.20)


 


Estimated GFR (


American) 98.7 


 


 


Estimated GFR (Non-


American 85.2 


 


 


BUN/Creatinine Ratio 16.8 (10-20) 


 


Random Glucose


  98 mg/dl


(70-99)


 


Calcium Level


  8.9 mg/dl


(8.5-10.1)


 


Total Bilirubin


  0.3 mg/dl


(0.2-1)


 


Aspartate Amino Transferase


(AST) 14 U/L (15-37) 


 


 


Alanine Aminotransferase (ALT) 25 U/L (12-78) 


 


Alkaline Phosphatase


  34 U/L


()


 


Total Protein


  6.8 gm/dl


(6.4-8.2)


 


Albumin


  3.6 gm/dl


(3.4-5.0)


 


Globulin


  3.2 gm/dl


(2.5-4.0)


 


Albumin/Globulin Ratio 1.1 (0.9-2) 


 


Triglycerides Level


  95 mg/dl


(0-150)


 


Cholesterol Level


  144 mg/dl


(0-200)


 


HDL Cholesterol 43 mg/dl 


 


LDL Cholesterol, Calculated 82 mg/dl 


 


VLDL Cholesterol, Calculated 19 mg/dl 


 


Cholesterol/HDL Ratio 3.3 


 


Thyroid Stimulating Hormone


(TSH) 2.710 uIu/ml


(0.300-4.500)











Additional Studies


 











Patient: VERÓNICA SOMMER 


Med Rec: [id number] Address1:  [address] [facility]


Address2:   


 


[id number]


YOB: 1971    Sex: F


Ref Phy: Rafaela Holguin PA-C


Att Phy:  Rafaela Holguin PA-C


Brandi Phy:  Richie Heredia M.D.


Inter Phy:  Gianna Akins MD  [facility] Zip:  [place] [address]


SC:  [email or web address]


Report #:  [id number]


Technician:  AXEL


Diagnosis:  HX LEFT BREAST CA/POST RADIATION/BILATERAL DUE


Service Date:  07/05/17


MNE:  [id number]


 


Ordering Dr: Rafaela Holguin PA-C


CC:  Rafaela Holguin PA-C CONF:  


DICTATED BY:  Gianna Akins MD 








 MAMMOGRAPHY REPORT


  





BILATERAL DIGITAL DIAGNOSTIC MAMMOGRAM TOMOSYNTHESIS WITH CAD: 7/5/2017


CLINICAL HISTORY: History of left breast DCIS status post lumpectomy January 2017 and radiation therapy.  The patient reports no current complaints.  








TECHNIQUE:  Breast tomosynthesis in addition to standard 2D mammography was 

performed. Current study was also evaluated with a Computer Aided Detection (CAD

) system.  Bilateral CC and MLO 2-D and tomosynthesis images and spot 

magnification left CC and ML views were obtained.





COMPARISON: Comparison is made to exams dated:  12/16/2016 mammogram, 12/16/ 2016 stereotactic biopsy, 12/14/2016 breast MRI, 11/18/2016 mammogram, 11/18/ 2016 stereotactic biopsy, and 11/14/2016 mammogram - [facility].   





BREAST COMPOSITION:  The tissue of both breasts is heterogeneously dense, which 

may obscure small masses.  





FINDINGS:  There are new expected postsurgical changes in the left upper outer 

quadrant from prior lumpectomy, including architectural distortion at the 

lumpectomy bed.  Spot magnification views of the lumpectomy bed demonstrate a 

single punctate calcification in the left lateral anterior breast which is 

stable dating back to at least the 2010 exam.  No suspicious masses or clusters 

of microcalcifications are noted.  There is mild diffuse left breast skin 

thickening, likely a sequela of prior radiation therapy.  





The remainder of both breasts are stable compared to prior exams, without 

suspicious masses, calcifications, or areas of architectural distortion noted.  

A biopsy marker clip is again noted within the left upper outer quadrant from 

prior benign stereotactic biopsy.








IMPRESSION:  ACR-BI-RADS CATEGORY 3: PROBABLY BENIGN


Expected post treatment changes in the left breast status post lumpectomy and 

radiation therapy, without mammographic evidence of malignancy in either 

breast.  Recommend follow-up diagnostic tomosynthesis mammograms of the left 

breast in 6 months to reevaluate the posttreatment changes.





The patient has been verbally notified of the results.  








Approximately 10% of breast cancers are not detected with mammography. A 

negative mammographic report should not delay biopsy if a clinically suggestive 

mass is present.





Gianna Akins M.D.          


/:7/5/2017 08:51:47  





Imaging Technologist: Jaylin ROMERO(TASHA)(JULIETTE), [facility]


letter sent: Personal History 3  


BI-RADS Code: ACR-BI-RADS Category 3: Probably Benign


__________________ ________________


Dictated by: Gianna Akins MD


Signed by: Gianna Akins MD





Assessment & Plan


Plan: The patient is also seen today by Dr. Su.  She'll continu mammography 

as scheduled.  She has a mammogram scheduled for January 5.  We discussed 

stretching exercises for the shoulder.  She'll start these gradually and 

continue on a regular basis.  She was also offered to go to physical therapy if 

needed.  She'll continue follow-up with the breast surgeon and medical 

oncologist.  We asked her to return to our office in 1 year.  She may call if 

she has any questions or concerns in the interim.  If she decides to have 

physical therapy she may call and we will give her a referral.





Assessment & Plan (Attending)


ADDENDUM: I agree with note created by Rafaela Holguin PA-C. I reviewed the 

patient's chart and information with her.  I have examined and evaluated the 

patient. I reviewed relevant clinical information and answered the patient's and

/or family's questions. 





Total Time


In Follow-Up


I spent 20 minutes speaking to the patient and performing examination.  I spent 

15 minutes reviewing information in completing this note.





Total Time (Attending)


In Follow-Up


I spent 15 minutes examining and counseling the patient. 





Copy To


Richie Heredia M.D.; ERASMO BARONE M.D.; Kalani Levin M.D.

## 2025-07-29 NOTE — ANESTHESIA POSTPROCEDURE EVALUATION
Department of Anesthesiology  Postprocedure Note    Patient: Paul Jacob  MRN: [id number]  YOB: 1990  Date of evaluation: 2025    Procedure Summary       Date: 25 Room / Location: [facility] L&D 02 / [facility] L&D OR    Anesthesia Start: 1220 Anesthesia Stop: 1338    Procedure:  SECTION (Abdomen) Diagnosis:       Previous  delivery affecting pregnancy      (Previous  delivery affecting pregnancy [O34.219])    Surgeons: Garrett Castillo II, MD Responsible Provider: Sergio Tabor MD    Anesthesia Type: Spinal ASA Status: 2            Anesthesia Type: Spinal    Lory Phase I: Lory Score: 9    Lory Phase II: Lory Score: 9    Anesthesia Post Evaluation    Patient location during evaluation: PACU  Patient participation: complete - patient participated  Level of consciousness: awake  Airway patency: patent  Nausea & Vomiting: no vomiting and no nausea  Cardiovascular status: hemodynamically stable  Respiratory status: acceptable  Hydration status: stable  Pain management: adequate    No notable events documented.

## 2025-07-29 NOTE — H&P
History & Physical    Name: Paul Jacob MRN: [id number]  SSN: [id number]    YOB: 1990  Age: 34 y.o.  Sex: female        Subjective:     Estimated Date of Delivery: 25  OB History          5    Para   2    Term   2       0    AB   2    Living   2         SAB   2    IAB   0    Ectopic   0    Molar   0    Multiple   0    Live Births   2                Ms. Christofer Jacob is admitted with pregnancy at 39w1d for repeat  section. Prenatal course was normal. Please see prenatal records for details.  Fetus active.  No LOF/VB/Contractions.      Past Medical History:   Diagnosis Date    Anxiety     Depression     IUD (REMOVED)     Mirena -- acne/weight gain  REMOVED     Malpositioned intrauterine device 2024     Past Surgical History:   Procedure Laterality Date    BREAST ENHANCEMENT SURGERY Bilateral      SECTION  2015     SECTION  2019    HYSTEROSCOPY N/A 2024    HYSTEROSCOPIC INTRAUTERINE DEVICE REMOVAL performed by Garrett Castillo II, MD at [facility] MAIN OR    RHINOPLASTY       Social History     Occupational History    Not on file   Tobacco Use    Smoking status: Never    Smokeless tobacco: Never   Vaping Use    Vaping status: Never Used   Substance and Sexual Activity    Alcohol use: Not Currently    Drug use: Never    Sexual activity: Yes     Partners: Male     Birth control/protection: I.U.D.     No family history on file.    No Known Allergies  Prior to Admission medications    Medication Sig Start Date End Date Taking? Authorizing Provider   folic acid (FOLVITE) 1 MG tablet Take 1 tablet by mouth daily 1/3/25  Yes Provider, MD Rahel   sertraline (ZOLOFT) 50 MG tablet Take 1 tablet by mouth daily 24  Yes Provider, MD Rahel   busPIRone (BUSPAR) 5 MG tablet Take 1 tablet by mouth daily 24  Yes Provider, MD Rahel   Misc. Devices (BREAST PUMP) MISC 1 Units by Does not apply route daily  Patient not taking: Reported on

## 2025-07-29 NOTE — OP NOTE
Repeat  Section Procedure Note         Name: Paul Jacob      Medical Record Number: [id number]      YOB: 1990     Today's Date: 2025      Preoperative Diagnosis: Previous  delivery affecting pregnancy [O34.219]    Postoperative Diagnosis: same    Procedure: Low Cervical Transverse Procedure(s):   SECTION    Surgeon:  Garrett Castillo MD     Assistant:  Staff    Anesthesia: Spinal    Prophylactic Antibiotics: Ancef     Fetal Description: braswell male    Birth Information:   Information for the patient's :  Christofer Jacob, Baby Pending Paul [[id number]]        Umbilical Cord: normal    Placenta:  manual    Specimens: * No specimens in log *     Implants:  None           Complications:  none    EBL: 500    Procedure Details:    The patient was prepped with Betadine and draped in the supine position with a spinal  anesthetic. Figueroa catheter had been placed using sterile technique.    A Pfannenstiel incision was made, excising her old skin scar.  The fascia was incised sharply and extended out bilaterally.  The rectus muscles were  in the midline.  The peritoneum was entered without difficulty.  Significant adhesions of the uterus to the anterior abdominal wall were lysed.  An Evelio O-ring retractor was placed.    The peritoneum over the lower uterine segment was incised and the bladder flap developed and dissected downward.  The lower uterine segment was incised sharply with a knife.  The Chorio-amniotic membranes were ruptured and medium amount clear fluid was encountered. The infant was then delivered onto the operative field and the mouth and nose bulb suctioned.  After a 45 second delay, the cord was clamped and cut and the infant handed off to the pediatric team in attendance.    The placenta was delivered manually; it was normal and intact.  It was not sent to pathology.  The uterine cavity was cleaned with a lap pad x2. Pitocin was given IV

## 2025-07-29 NOTE — CONSULTS
Session ID: [id number]  Session Duration: 22 minutes  Language: Ethiopian   ID: #[id number]   Name: Julieth

## 2025-07-29 NOTE — CONSULTS
Session ID: [id number]  Session Duration: Longer than 54 minutes  Language: Polish   ID: #[id number]   Name: Burak

## 2025-07-29 NOTE — CONSULTS
Session ID: [id number]  Session Duration: Longer than 54 minutes  Language: Albanian   ID: #[id number]   Name: Love

## 2025-07-29 NOTE — ANESTHESIA PROCEDURE NOTES
Spinal Block    Patient location during procedure: OB  End time: 7/29/2025 12:43 PM  Reason for block: post-op pain management, primary anesthetic and at surgeon's request  Staffing  Performed: anesthesiologist   Anesthesiologist: Sergio Tabor MD  Resident/CRNA: Sammie Stark APRN - CRNA  Performed by: Sammie Stark APRN - CRNA  Authorized by: Sergio Tabor MD    Spinal Block  Patient position: sitting  Prep: DuraPrep  Patient monitoring: cardiac monitor, continuous pulse ox, continuous capnometry, frequent blood pressure checks and oxygen  Approach: midline  Location: L3/L4  Provider prep: mask and sterile gloves  Local infiltration: lidocaine  Needle  Needle type: Pencan   Needle gauge: 25 G  Needle length: 3.5 in  Assessment  Swirl obtained: Yes  CSF: clear  Attempts: 2  Hemodynamics: stable  Additional Notes  Procedure complicated by pt movement, anxiety, and need for frequent posture redirections.  at bedside for comfort  Preanesthetic Checklist  Completed: patient identified, IV checked, site marked, risks and benefits discussed, surgical/procedural consents, pre-op evaluation, timeout performed, anesthesia consent given, oxygen available and monitors applied/VS acknowledged

## 2025-07-30 LAB
ERYTHROCYTE [DISTWIDTH] IN BLOOD BY AUTOMATED COUNT: 12.7 % (ref 11.5–14.5)
HCT VFR BLD AUTO: 29.2 % (ref 35–47)
HGB BLD-MCNC: 9.7 G/DL (ref 11.5–16)
MCH RBC QN AUTO: 30.2 PG (ref 26–34)
MCHC RBC AUTO-ENTMCNC: 33.2 G/DL (ref 30–36.5)
MCV RBC AUTO: 91 FL (ref 80–99)
NRBC # BLD: 0 K/UL (ref 0–0.01)
NRBC BLD-RTO: 0 PER 100 WBC
PLATELET # BLD AUTO: 216 K/UL (ref 150–400)
PMV BLD AUTO: 10.8 FL (ref 8.9–12.9)
RBC # BLD AUTO: 3.21 M/UL (ref 3.8–5.2)
T PALLIDUM AB SER QL AGGL: NON REACTIVE
WBC # BLD AUTO: 8.8 K/UL (ref 3.6–11)

## 2025-07-30 PROCEDURE — 6370000000 HC RX 637 (ALT 250 FOR IP): Performed by: OBSTETRICS & GYNECOLOGY

## 2025-07-30 PROCEDURE — 36415 COLL VENOUS BLD VENIPUNCTURE: CPT

## 2025-07-30 PROCEDURE — 85461 HEMOGLOBIN FETAL: CPT

## 2025-07-30 PROCEDURE — 86900 BLOOD TYPING SEROLOGIC ABO: CPT

## 2025-07-30 PROCEDURE — 85027 COMPLETE CBC AUTOMATED: CPT

## 2025-07-30 PROCEDURE — 1120000000 HC RM PRIVATE OB

## 2025-07-30 PROCEDURE — 86901 BLOOD TYPING SEROLOGIC RH(D): CPT

## 2025-07-30 PROCEDURE — 94761 N-INVAS EAR/PLS OXIMETRY MLT: CPT

## 2025-07-30 PROCEDURE — 6360000002 HC RX W HCPCS: Performed by: OBSTETRICS & GYNECOLOGY

## 2025-07-30 RX ORDER — PEPPERMINT OIL
SPIRIT ORAL PRN
Status: DISCONTINUED | OUTPATIENT
Start: 2025-07-30 | End: 2025-07-31 | Stop reason: HOSPADM

## 2025-07-30 RX ADMIN — ACETAMINOPHEN 1000 MG: 500 TABLET ORAL at 01:58

## 2025-07-30 RX ADMIN — ACETAMINOPHEN 1000 MG: 500 TABLET ORAL at 20:19

## 2025-07-30 RX ADMIN — KETOROLAC TROMETHAMINE 30 MG: 30 INJECTION, SOLUTION INTRAMUSCULAR at 04:28

## 2025-07-30 RX ADMIN — ACETAMINOPHEN 1000 MG: 500 TABLET ORAL at 10:31

## 2025-07-30 RX ADMIN — OXYCODONE 10 MG: 5 TABLET ORAL at 20:24

## 2025-07-30 RX ADMIN — DIPHENHYDRAMINE HYDROCHLORIDE 25 MG: 50 INJECTION INTRAMUSCULAR; INTRAVENOUS at 04:31

## 2025-07-30 RX ADMIN — IBUPROFEN 800 MG: 800 TABLET ORAL at 16:32

## 2025-07-30 RX ADMIN — KETOROLAC TROMETHAMINE 30 MG: 30 INJECTION, SOLUTION INTRAMUSCULAR at 10:31

## 2025-07-30 ASSESSMENT — PAIN DESCRIPTION - LOCATION
LOCATION: ABDOMEN;INCISION
LOCATION: ABDOMEN;INCISION
LOCATION: ABDOMEN
LOCATION: ABDOMEN;INCISION

## 2025-07-30 ASSESSMENT — PAIN DESCRIPTION - ORIENTATION
ORIENTATION: LOWER
ORIENTATION: ANTERIOR
ORIENTATION: LOWER

## 2025-07-30 ASSESSMENT — PAIN SCALES - GENERAL
PAINLEVEL_OUTOF10: 5
PAINLEVEL_OUTOF10: 6
PAINLEVEL_OUTOF10: 5
PAINLEVEL_OUTOF10: 7
PAINLEVEL_OUTOF10: 6

## 2025-07-30 ASSESSMENT — PAIN DESCRIPTION - DESCRIPTORS
DESCRIPTORS: ACHING
DESCRIPTORS: ACHING
DESCRIPTORS: SORE

## 2025-07-30 ASSESSMENT — PAIN - FUNCTIONAL ASSESSMENT
PAIN_FUNCTIONAL_ASSESSMENT: ACTIVITIES ARE NOT PREVENTED

## 2025-07-30 NOTE — LACTATION NOTE
This note was copied from a baby's chart.  Mom sleeping at this time. FOB states baby just breast fed at 1200. LC encourages MOB to call at next feeding. RN states baby has been breastfeeding well.

## 2025-07-30 NOTE — LACTATION NOTE
This note was copied from a baby's chart.  Mom states baby has been breastfeeding well; denies pain with feedings. Mom breast fed her other 2 kids for one year each with no issues. LC assists in latching baby in cross cradle; LC demonstrates how to get a deeper latch. Mom reports taking Buspar and Zoloft in the past; LC explains Paulette Meds information. LC encourages MOB to feed baby every 2-3 hours. Mom has a breast pump at home. All questions answered.    Discussed with mother her plan for feeding.  Reviewed the benefits of exclusive breast milk feeding during the hospital stay. She acknowledges understanding of information reviewed and states that it is her plan to breastfeed her infant.  Will support her choice and offer additional information as needed.     Reviewed breastfeeding basics:  Education provided in accordance with The Ten Steps To Successful Breastfeeding.  How milk is made and normal  breastfeeding behaviors discussed.  Supply and demand,  stomach size, early feeding cues, skin to skin bonding with comfortable positioning and baby led latch-on reviewed.  How to identify signs of successful breastfeeding sessions reviewed; education on asymetrical latch, signs of effective latching vs shallow, in-effective latching, normal  feeding frequency and duration and expected infant output discussed.  Breastfeeding Booklet and Warm line information provided with discussion. Also, adhere to the Ten Steps to Successful Breastfeeding.  Discussed typical  weight loss and the importance of pediatrician appointment within 24-48 hours of discharge, at 2 weeks of life and normalcy of requesting pediatric weight checks as needed in between visits.    Pt will successfully establish breastfeeding by feeding in response to early feeding cues   or wake every 3h, will obtain deep latch, and will keep log of feedings/output.  Taught to BF at hunger cues and or q 2-3 hrs and to offer 10-20 drops

## 2025-07-30 NOTE — CONSULTS
Session ID: [id number]  Session Duration: 12 minutes  Language: Anguillan   ID: #[id number]   Name: Cathy

## 2025-07-30 NOTE — CONSULTS
Session ID: [id number]  Session Duration: Longer than 54 minutes  Language: Lao   ID: #[id number]   Name: Alex

## 2025-07-31 VITALS
WEIGHT: 205 LBS | DIASTOLIC BLOOD PRESSURE: 72 MMHG | RESPIRATION RATE: 16 BRPM | BODY MASS INDEX: 34.16 KG/M2 | HEART RATE: 104 BPM | SYSTOLIC BLOOD PRESSURE: 124 MMHG | TEMPERATURE: 98.4 F | HEIGHT: 65 IN | OXYGEN SATURATION: 99 %

## 2025-07-31 PROCEDURE — 6370000000 HC RX 637 (ALT 250 FOR IP): Performed by: OBSTETRICS & GYNECOLOGY

## 2025-07-31 PROCEDURE — 6360000002 HC RX W HCPCS: Performed by: OBSTETRICS & GYNECOLOGY

## 2025-07-31 PROCEDURE — 96372 THER/PROPH/DIAG INJ SC/IM: CPT

## 2025-07-31 RX ORDER — IBUPROFEN 800 MG/1
800 TABLET, FILM COATED ORAL EVERY 6 HOURS PRN
Qty: 60 TABLET | Refills: 1 | Status: SHIPPED | OUTPATIENT
Start: 2025-07-31 | End: 2025-08-30

## 2025-07-31 RX ORDER — ACETAMINOPHEN AND CODEINE PHOSPHATE 120; 12 MG/5ML; MG/5ML
1 SOLUTION ORAL DAILY
Qty: 84 TABLET | Refills: 2 | Status: SHIPPED | OUTPATIENT
Start: 2025-07-31 | End: 2025-10-23

## 2025-07-31 RX ORDER — OXYCODONE AND ACETAMINOPHEN 5; 325 MG/1; MG/1
1 TABLET ORAL EVERY 6 HOURS PRN
Qty: 20 TABLET | Refills: 0 | Status: SHIPPED | OUTPATIENT
Start: 2025-07-31 | End: 2025-08-07

## 2025-07-31 RX ADMIN — IBUPROFEN 800 MG: 800 TABLET ORAL at 10:29

## 2025-07-31 RX ADMIN — ACETAMINOPHEN 1000 MG: 500 TABLET ORAL at 05:11

## 2025-07-31 RX ADMIN — OXYCODONE 10 MG: 5 TABLET ORAL at 05:11

## 2025-07-31 RX ADMIN — HUMAN RHO(D) IMMUNE GLOBULIN 300 MCG: 300 INJECTION, SOLUTION INTRAMUSCULAR at 10:24

## 2025-07-31 RX ADMIN — IBUPROFEN 800 MG: 800 TABLET ORAL at 00:25

## 2025-07-31 ASSESSMENT — PAIN SCALES - GENERAL
PAINLEVEL_OUTOF10: 9
PAINLEVEL_OUTOF10: 5
PAINLEVEL_OUTOF10: 6

## 2025-07-31 ASSESSMENT — PAIN DESCRIPTION - LOCATION
LOCATION: ABDOMEN;INCISION
LOCATION: ABDOMEN
LOCATION: ABDOMEN;INCISION

## 2025-07-31 ASSESSMENT — PAIN - FUNCTIONAL ASSESSMENT
PAIN_FUNCTIONAL_ASSESSMENT: ACTIVITIES ARE NOT PREVENTED
PAIN_FUNCTIONAL_ASSESSMENT: ACTIVITIES ARE NOT PREVENTED

## 2025-07-31 ASSESSMENT — PAIN DESCRIPTION - ORIENTATION
ORIENTATION: ANTERIOR
ORIENTATION: ANTERIOR

## 2025-07-31 ASSESSMENT — PAIN DESCRIPTION - DESCRIPTORS
DESCRIPTORS: SORE;ACHING
DESCRIPTORS: SORE

## 2025-07-31 NOTE — LACTATION NOTE
This note was copied from a baby's chart.  Mom states breastfeeding has been going well with some soreness; Lanolin provided.  encouraged MOB to get a deep latch with every feeding. Baby sleeping in Dad's arms at this time.  encourages MOB to continue feeding every 2-3 hours. Mom has a breast pump at home;  measures for appropriate flange size. Preparation and storage of breast milk explained. Symptoms of mastitis discussed and when to notify OB. Engorgement care explained. WARM line information provided. All questions answered.    Discussed with mother her plan for feeding.  Reviewed the benefits of exclusive breast milk feeding during the hospital stay.  She acknowledges understanding of information reviewed and states that it is her plan to breastfeed her infant.  Will support her choice and offer additional information as needed.     Reviewed breastfeeding basics:  Education provided in accordance with The Ten Steps To Successful Breastfeeding.  How milk is made and normal  breastfeeding behaviors discussed.  Supply and demand,  stomach size, early feeding cues, skin to skin bonding with comfortable positioning and baby led latch-on reviewed.  How to identify signs of successful breastfeeding sessions reviewed; education on asymetrical latch, signs of effective latching vs shallow, in-effective latching, normal  feeding frequency and duration and expected infant output discussed.  Breastfeeding Booklet and Warm line information provided with discussion. Also, adhere to the Ten Steps to Successful Breastfeeding.  Discussed typical  weight loss and the importance of pediatrician appointment within 24-48 hours of discharge, at 2 weeks of life and normalcy of requesting pediatric weight checks as needed in between visits.    Pt will successfully establish breastfeeding by feeding in response to early feeding cues   or wake every 3h, will obtain deep latch, and will keep log of

## 2025-07-31 NOTE — DISCHARGE SUMMARY
Obstetrical Discharge Summary     Name: Paul Jacob MRN: [id number]  SSN: [id number]    YOB: 1990  Age: 34 y.o.  Sex: female      Admit Date: 2025    Discharge Date: 2025     Admitting Physician: Garrett Thomas II, MD     Attending Physician:  Garrett Thomas II, MD     Admission Diagnoses: Previous  delivery affecting pregnancy [O34.219]  H/O  section complicating pregnancy [O34.219]  Discharge Diagnoses:   Previous  delivery affecting pregnancy [O34.219]  H/O  section complicating pregnancy [O34.219]    Additional Diagnoses: Principal Problem:    H/O  section complicating pregnancy  Active Problems:    Rh negative, antepartum    Anemia of pregnancy    Pregnancy with 39 completed weeks gestation  Resolved Problems:    * No resolved hospital problems. *       Immunization(s):   Immunization History   Administered Date(s) Administered    TDaP, ADACEL (age 10y-64y), BOOSTRIX (age 10y+), IM, 0.5mL 2025        Delivery Information:  Delivery Type: , Low Transverse     By: GARRETT THOMAS II   On: 2025  at: 12:57 PM '     Hospital Course: Normal hospital course following the delivery.  The patient was released to her home in good condition.    Patient Instructions:     Reference my discharge instructions.    Current Discharge Medication List        START taking these medications    Details   ibuprofen (ADVIL;MOTRIN) 800 MG tablet Take 1 tablet by mouth every 6 hours as needed for Pain  Qty: 60 tablet, Refills: 1  Start date: 2025, End date: 2025      oxyCODONE-acetaminophen (PERCOCET) 5-325 MG per tablet Take 1 tablet by mouth every 6 hours as needed for Pain for up to 7 days. Intended supply: 3 days. Take lowest dose possible to manage pain Max Daily Amount: 4 tablets  Qty: 20 tablet, Refills: 0  Start date: 2025, End date: 2025    Comments: Reduce doses taken as pain becomes manageable Reduce doses taken as pain  becomes manageable  Associated Diagnoses: Post-op pain      norethindrone (MICRONOR) 0.35 MG tablet Take 1 tablet by mouth daily  Qty: 84 tablet, Refills: 2  Start date: 7/31/2025, End date: 10/23/2025           CONTINUE these medications which have NOT CHANGED    Details   folic acid (FOLVITE) 1 MG tablet Take 1 tablet by mouth daily      sertraline (ZOLOFT) 50 MG tablet Take 1 tablet by mouth daily      busPIRone (BUSPAR) 5 MG tablet Take 1 tablet by mouth daily      !! Misc. Devices (BREAST PUMP) MISC 1 Units by Does not apply route daily  Qty: 1 each, Refills: 0      !! Misc. Devices (BREAST PUMP) MISC 1 Units by Does not apply route daily  Qty: 1 each, Refills: 0      Prenatal MV-Min-Fe Fum-FA-DHA (PRENATAL MULTI +DHA) 27-0.8-200 MG CAPS Take 1 capsule by mouth daily  Qty: 90 capsule, Refills: 2       !! - Potential duplicate medications found. Please discuss with provider.          I spent 10 minutes discharging the patient in face to face contact.    Follow-up Information       Follow up With Specialties Details Why Contact Info    Garrett Castillo II, MD Obstetrics & Gynecology Follow up in 6 week(s) Post Partum [id number] [address] [id number] [phone]                 Signed By:  Garrett Castillo MD     July 31, 2025

## 2025-07-31 NOTE — PROGRESS NOTES
Post-Operative Day Number 1 Progress Note    Patient doing well post-op day 1 from  delivery without significant complaints.  Pain controlled on current medication.  Figueroa removed and voiding, normal lochia.  Tolerating regular diet without nausea or vomiting. Ambulating without dificulty.    Vitals:  Patient Vitals for the past 8 hrs:   BP Temp Temp src Pulse Resp SpO2   25 0601 111/86 97.7 °F (36.5 °C) Oral 71 17 99 %   25 0219 126/80 98.2 °F (36.8 °C) Oral 79 17 96 %     Temp (24hrs), Av.9 °F (36.6 °C), Min:97.3 °F (36.3 °C), Max:98.6 °F (37 °C)                   Intake/Output Summary (Last 24 hours) at 2025 0959  Last data filed at 2025 0428  Gross per 24 hour   Intake 1000 ml   Output 2225 ml   Net -1225 ml         Exam:  Patient without distress               Lungs:  CTA bilaterally               CV:  Regular rate and rhythm               Abdomen soft, nondistended, normal bowel sounds               Uterus:  fundus firm at level of umbilicus, nontender.                 Incision:  covered in bandage with minimal staining               Lower extremities are negative for cords or tenderness; minimal swelling.    Labs:   Recent Results (from the past 24 hours)   CBC with Auto Differential    Collection Time: 25 11:07 AM   Result Value Ref Range    WBC 10.2 3.6 - 11.0 K/uL    RBC 3.67 (L) 3.80 - 5.20 M/uL    Hemoglobin 10.9 (L) 11.5 - 16.0 g/dL    Hematocrit 32.3 (L) 35.0 - 47.0 %    MCV 88.0 80.0 - 99.0 FL    MCH 29.7 26.0 - 34.0 PG    MCHC 33.7 30.0 - 36.5 g/dL    RDW 12.8 11.5 - 14.5 %    Platelets 293 150 - 400 K/uL    MPV 11.1 8.9 - 12.9 FL    Nucleated RBCs 0.0 0  WBC    nRBC 0.00 0.00 - 0.01 K/uL    Neutrophils % 65.0 32.0 - 75.0 %    Lymphocytes % 23.8 12.0 - 49.0 %    Monocytes % 7.6 5.0 - 13.0 %    Eosinophils % 0.9 0.0 - 7.0 %    Basophils % 0.6 0.0 - 1.0 %    Immature Granulocytes % 2.1 (H) 0.0 - 0.5 %    Neutrophils Absolute 6.63 
Pt arrived to unit  
SSI kit given to patient with instructions for use.  SSI reduction education provided to patient and she verbalizes understanding.   
NORMOCHROMIC     Treponema Pallidum AB TP-PA    Collection Time: 25 11:07 AM   Result Value Ref Range    T Pallidum Antibodies (TP-PA) Non Reactive Non Reactive   TYPE AND SCREEN    Collection Time: 25 11:07 AM   Result Value Ref Range    Crossmatch expiration date 2025,8999     ABO/Rh O NEGATIVE     Antibody Screen NEG    Extra Tubes Hold    Collection Time: 25 11:14 AM   Result Value Ref Range    Specimen HOld GOLD     Comment:        Add-on orders for these samples will be processed based on acceptable specimen integrity and analyte stability, which may vary by analyte.   RH IMMUNE GLOBULIN EVALUATION    Collection Time: 25  6:03 AM   Result Value Ref Range    ABO/Rh O NEGATIVE     Fetal Screen NEG     Unit Number [id number]     Product Code Blood Bank RH IMMUNE GLOBULIN     Unit Divison 00     Dispense Status Blood Bank ALLOCATED    CBC    Collection Time: 25  6:03 AM   Result Value Ref Range    WBC 8.8 3.6 - 11.0 K/uL    RBC 3.21 (L) 3.80 - 5.20 M/uL    Hemoglobin 9.7 (L) 11.5 - 16.0 g/dL    Hematocrit 29.2 (L) 35.0 - 47.0 %    MCV 91.0 80.0 - 99.0 FL    MCH 30.2 26.0 - 34.0 PG    MCHC 33.2 30.0 - 36.5 g/dL    RDW 12.7 11.5 - 14.5 %    Platelets 216 150 - 400 K/uL    MPV 10.8 8.9 - 12.9 FL    Nucleated RBCs 0.0 0  WBC    nRBC 0.00 0.00 - 0.01 K/uL       Assessment and Plan  Principal Problem:    H/O  section complicating pregnancy  Active Problems:    Rh negative, antepartum    Anemia of pregnancy    Pregnancy with 39 completed weeks gestation  Resolved Problems:    * No resolved hospital problems. *     Patient doing well 2 Days Post-Op post , Low Transverse delivery   Continue routine post-op care and maternal education.

## 2025-08-01 LAB
ABO + RH BLD: NORMAL
BLD PROD TYP BPU: NORMAL
BLOOD BANK BLOOD PRODUCT EXPIRATION DATE: NORMAL
BLOOD BANK DISPENSE STATUS: NORMAL
BPU ID: NORMAL
FETAL SCREEN: NORMAL
UNIT DIVISION: 0
UNIT ISSUE DATE/TIME: NORMAL

## 2025-08-28 ENCOUNTER — TELEPHONE (OUTPATIENT)
Age: 35
End: 2025-08-28

## (undated) DEVICE — SUTURE MONOCRYL + SZ 3-0 L27IN ABSRB UD L26MM SH 1/2 CIR MCP416H

## (undated) DEVICE — TRAY,URINE METER,100% SILICONE,16FR10ML: Brand: MEDLINE

## (undated) DEVICE — Z DISCONTINUED USE 2220179 SUTURE VCRL SZ 0 L36IN ABSRB VLT L40MM CT 1/2 CIR J358H

## (undated) DEVICE — GOWN,SIRUS,NONRNF,SETINSLV,2XL,18/CS: Brand: MEDLINE

## (undated) DEVICE — SET ENDOSCP SEAL HYSTEROSCOPE RIG OUTFLO CHN DISP MYOSURE

## (undated) DEVICE — GLOVE ORANGE PI 8   MSG9080

## (undated) DEVICE — BLADE CLIPPER GEN PURP NS

## (undated) DEVICE — GARMENT,MEDLINE,DVT,INT,CALF,MED, GEN2: Brand: MEDLINE

## (undated) DEVICE — CLEANER ES TIP W2XL2IN ADH BK RADPQ FOR S STL ELECTRD

## (undated) DEVICE — SUTURE VICRYL SZ 2-0 L36IN ABSRB UD L36MM CT-1 1/2 CIR J945H

## (undated) DEVICE — SOLUTION IV 1000ML 0.9% SOD CHL

## (undated) DEVICE — SYRINGE IRRIG 60ML SFT PLIABLE BLB EZ TO GRP 1 HND USE W/

## (undated) DEVICE — SOLUTION IRRIG 1000ML STRL H2O USP PLAS POUR BTL

## (undated) DEVICE — SOLUTION IRRIG 3000ML 0.9% SOD CHL USP UROMATIC PLAS CONT

## (undated) DEVICE — APPLICATOR MEDICATED 26 CC SOLUTION HI LT ORNG CHLORAPREP

## (undated) DEVICE — GLOVE SURG SZ 8 L12IN FNGR THK79MIL GRN LTX FREE

## (undated) DEVICE — INTENT OT USE PROVIDES A STERILE INTERFACE BETWEEN THE OPERATING ROOM SURGICAL LAMPS (NON-STERILE) AND THE SURGEON OR STAFF WORKING IN THE STERILE FIELD.: Brand: ASPEN® ALC PLUS LIGHT HANDLE COVER

## (undated) DEVICE — CONNEXT SURGICAL MATRIX - LARGE SYRINGE: Brand: CONNEXT SURGICAL MATRIX

## (undated) DEVICE — PERI GYN-SFMC: Brand: MEDLINE INDUSTRIES, INC.

## (undated) DEVICE — STRAP,POSITIONING,KNEE/BODY,FOAM,4X60": Brand: MEDLINE

## (undated) DEVICE — Z DUP USE 2271313 SOLIDIFIER FLUID 3000 CC ABSORB

## (undated) DEVICE — SPONGE: LAP 18X18 W  200/CS: Brand: MEDICAL ACTION INDUSTRIES

## (undated) DEVICE — PENCIL ES L3M ROCK SWCH S STL HEX LOK BLDE ELECTRD HOLSTER

## (undated) DEVICE — GOWN,SIRUS,FABRNF,XL,20/CS: Brand: MEDLINE

## (undated) DEVICE — TOWEL,OR,DSP,ST,BLUE,STD,2/PK,40PK/CS: Brand: MEDLINE

## (undated) DEVICE — C-SECTION II-LF: Brand: MEDLINE INDUSTRIES, INC.

## (undated) DEVICE — ELECTRODE PT RET AD L9FT HI MOIST COND ADH HYDRGEL CORDED

## (undated) DEVICE — FLUID MGMT SYS FLUENT KIT 6/PK

## (undated) DEVICE — 3000CC GUARDIAN II: Brand: GUARDIAN